# Patient Record
Sex: MALE | Race: BLACK OR AFRICAN AMERICAN | NOT HISPANIC OR LATINO | Employment: UNEMPLOYED | URBAN - METROPOLITAN AREA
[De-identification: names, ages, dates, MRNs, and addresses within clinical notes are randomized per-mention and may not be internally consistent; named-entity substitution may affect disease eponyms.]

---

## 2018-07-20 ENCOUNTER — HISTORICAL (OUTPATIENT)
Dept: RADIOLOGY | Facility: HOSPITAL | Age: 43
End: 2018-07-20

## 2021-02-24 ENCOUNTER — HISTORICAL (OUTPATIENT)
Dept: ADMINISTRATIVE | Facility: HOSPITAL | Age: 46
End: 2021-02-24

## 2021-03-05 ENCOUNTER — HISTORICAL (OUTPATIENT)
Dept: ADMINISTRATIVE | Facility: HOSPITAL | Age: 46
End: 2021-03-05

## 2021-03-05 LAB — SARS-COV-2 RNA RESP QL NAA+PROBE: NOT DETECTED

## 2021-03-09 ENCOUNTER — HISTORICAL (OUTPATIENT)
Dept: SURGERY | Facility: HOSPITAL | Age: 46
End: 2021-03-09

## 2021-03-24 ENCOUNTER — HISTORICAL (OUTPATIENT)
Dept: ADMINISTRATIVE | Facility: HOSPITAL | Age: 46
End: 2021-03-24

## 2021-04-28 ENCOUNTER — HISTORICAL (OUTPATIENT)
Dept: ADMINISTRATIVE | Facility: HOSPITAL | Age: 46
End: 2021-04-28

## 2021-12-22 ENCOUNTER — PATIENT OUTREACH (OUTPATIENT)
Dept: EMERGENCY MEDICINE | Facility: HOSPITAL | Age: 46
End: 2021-12-22

## 2022-01-07 ENCOUNTER — HISTORICAL (OUTPATIENT)
Dept: ADMINISTRATIVE | Facility: HOSPITAL | Age: 47
End: 2022-01-07

## 2022-01-07 LAB
ALBUMIN SERPL-MCNC: 4.7 GM/DL (ref 3.5–5)
ALBUMIN/GLOB SERPL: 1.8 RATIO (ref 1.1–2)
ALP SERPL-CCNC: 72 UNIT/L (ref 40–150)
ALT SERPL-CCNC: 16 UNIT/L (ref 0–55)
AST SERPL-CCNC: 14 UNIT/L (ref 5–34)
BILIRUB SERPL-MCNC: 0.4 MG/DL
BILIRUBIN DIRECT+TOT PNL SERPL-MCNC: 0.2 MG/DL (ref 0–0.5)
BILIRUBIN DIRECT+TOT PNL SERPL-MCNC: 0.2 MG/DL (ref 0–0.8)
BUN SERPL-MCNC: 9.4 MG/DL (ref 8.9–20.6)
CALCIUM SERPL-MCNC: 10 MG/DL (ref 8.7–10.5)
CHLORIDE SERPL-SCNC: 108 MMOL/L (ref 98–107)
CHOLEST SERPL-MCNC: 210 MG/DL
CHOLEST/HDLC SERPL: 5 {RATIO} (ref 0–5)
CO2 SERPL-SCNC: 25 MMOL/L (ref 22–29)
CREAT SERPL-MCNC: 1.34 MG/DL (ref 0.73–1.18)
CRP SERPL HS-MCNC: 0.05 MG/DL
DEPRECATED CALCIDIOL+CALCIFEROL SERPL-MC: 22.5 NG/ML (ref 30–80)
ERYTHROCYTE [SEDIMENTATION RATE] IN BLOOD: 5 MM/HR (ref 0–15)
EST CREAT CLEARANCE SER (OHS): 65.61 ML/MIN
EST. AVERAGE GLUCOSE BLD GHB EST-MCNC: 111.2 MG/DL
GLOBULIN SER-MCNC: 2.6 GM/DL (ref 2.4–3.5)
GLUCOSE SERPL-MCNC: 92 MG/DL (ref 74–100)
HBA1C MFR BLD: 5.5 %
HDLC SERPL-MCNC: 41 MG/DL (ref 35–60)
LDLC SERPL CALC-MCNC: 144 MG/DL (ref 50–140)
POTASSIUM SERPL-SCNC: 4.3 MMOL/L (ref 3.5–5.1)
PROT SERPL-MCNC: 7.3 GM/DL (ref 6.4–8.3)
PSA SERPL-MCNC: 0.4 NG/ML
SODIUM SERPL-SCNC: 140 MMOL/L (ref 136–145)
T4 FREE SERPL-MCNC: 0.87 NG/DL (ref 0.7–1.48)
TRIGL SERPL-MCNC: 123 MG/DL (ref 34–140)
TSH SERPL-ACNC: 0.84 UIU/ML (ref 0.35–4.94)
VIT B12 SERPL-MCNC: 494 PG/ML (ref 213–816)
VLDLC SERPL CALC-MCNC: 25 MG/DL

## 2022-03-18 ENCOUNTER — HISTORICAL (OUTPATIENT)
Dept: ADMINISTRATIVE | Facility: HOSPITAL | Age: 47
End: 2022-03-18

## 2022-04-10 ENCOUNTER — HISTORICAL (OUTPATIENT)
Dept: ADMINISTRATIVE | Facility: HOSPITAL | Age: 47
End: 2022-04-10
Payer: MEDICAID

## 2022-04-29 VITALS
BODY MASS INDEX: 27.16 KG/M2 | HEIGHT: 67 IN | OXYGEN SATURATION: 98 % | SYSTOLIC BLOOD PRESSURE: 122 MMHG | WEIGHT: 173.06 LBS | DIASTOLIC BLOOD PRESSURE: 74 MMHG

## 2022-04-30 NOTE — H&P
Patient:   Owen Montiel             MRN: [id number]            FIN: [id number]               Age:   45 years     Sex:  Male     :  1975   Associated Diagnoses:   None   Author:   Cisco Marquis MD      Patient presents today for surgery. There are no interval changes to the formal history and physical examination obtained at previous clinic appointment.     Patient will undergo surgery as described in previous appointment documentation.     Cisco Marquis MD
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
## 2022-05-02 NOTE — HISTORICAL [facility] CERNER
This is a historical note converted from [facility]. Formatting and pictures may have been removed.  Please reference [facility] for original formatting and attached multimedia. DATE OF SURGERY:?03/09/21  ?  PREOPERATIVE DIAGNOSES:  1.?right?ankle fracture  ?   POSTOPERATIVE DIAGNOSES:  ?   Same  ?   PROCEDURE: Open reduction and internal fixation of ankle fracture  ?  ATTENDING PHYSICIAN:?Gallo Mena MD  ?  RESIDENT PHYSICIANS: Dr Cisco Maruqis PGY3  ?  ANESTHESIA: Regional + General  ?  EBL: 20cc  ?  TOURNIQUET: 250 mmHg 35 min  ?  SPECIMEN: none  ?  IMPLANTS: Destiny Variax fibular plate, Arthrex tightrope  ?  COMPLICATIONS: none  ?  DISPOSITION: To recovery room in stable condition  ?  ?  INDICATIONS FOR PROCEDURE:?Owen Montiel?is a?45 Years?Male?who presented to our clinic for follow up assessment and definitive management of their ankle fracture.? While in clinic the risks, benefits, outcomes, and alternatives of conservative versus operative management were discussed with the patient. ?Informed consent was obtained for an open reduction and?internal fixation of the?ankle fracture.  ?  PROCEDURE IN DETAIL: ?The patient was brought into the operating room, positioned supine on the OR table. ?General anesthetic was administered by our anesthesia colleagues. ?The? ankle was prepped and draped in the usual fashion. ?A preoperative pause was performed to confirm the site and side for surgery. ?Preoperative antibiotics were given.?  ?  An incision was made over the distal fibular fracture site. ?This was carried down through subcutaneous tissue and down onto bone. ?Full-thickness flaps were elevated. ?Blunt dissection was made proximally to the fracture to protect the superficial peroneal nerve. ?The fracture was exposed and irrigated. ?It was cleaned with a combination of a?curette and small rongeurs.?  ?  FIBULAR FIXATION  ?  The fibular fracture was anatomically reduced with a combination of lobster claw and  pointed reduction forceps.???Utilizing a Woodruff variax fibular plate, we stabilized the fracture both proximally and distally with olives. ?Using intraoperative fluoroscopy, we confirmed that our fracture was anatomically reduced and that our hardware was in excellent position. ?We filled the plate distally with 3.5 mm locking screws and proximally with 3.5 mm cortical screws.  ?  SYNDESMOSIS FIXATION  ?  Once we were satisfied with our fixation, we screened the ankle under intraoperative fluoroscopy. ?External rotation stress demonstrated widening of the syndesmosis as well as a medial clear space.? We placed an Arthrex TightRope across the syndesmosis under direct fluoroscopic visualization. ?With manual compression, the TightRope was tightened down into its final position. ?We tied the lateral aspect of the TightRope and cut the remaining sutures.?  ?  We then screened the ankle under intraoperative fluoroscopy to confirm that our fracture was reduced anatomically and that our hardware was in excellent position and well aligned. ?Under external rotation stress, there was no syndesmosis or medial clear space widening.?  ?  A thorough irrigation of the hardware and the wound was performed.? The subcutaneous tissue was closed with 2-0 Vicryl suture. ?Skin was closed with 3-0 nylon sutures.??A sterile dressing was applied and the patient was placed in a short-leg splint. ?General anesthetic was reversed. ?There were no intraoperative complications. ?Estimated blood loss was 20 cc. ?The patient was transferred to a hospital stretcher and brought to the recovery room in stable condition.  ?  POSTOPERATIVE PLAN: ?The patient will remain nonweightbearing on the ankle for a total of 6-8 weeks from the time of surgery. ?We will them?in clinic in?2 weeks time to remove the splint, perform a wound check, remove sutures, and begin gentle range of motion exercises for the ankle.  ?

## 2022-05-14 NOTE — PROGRESS NOTES
Patient:   Owen Montiel             MRN: [id number]            FIN: [id number]               Age:   46 years     Sex:  Male     :  1975   Associated Diagnoses:   None   Author:   Eusebio Mchugh DO      Chief Complaint   right foot wound      History of Present Illness   Patient presents with right foot wound.  He has some maceration and pain between fourth and fifth digit in the toe web.  It has been present for years and he has flares every so often.  It has been worse over the last few weeks.  It is whitish in color and itches.  He has been applying over-the-counter fungal cream with little help.  He is otherwise healthy and only takes Cymbalta.    Today's information: Patient here for follow-up tinea pedis.  He has been applying ketoconazole to the wound and is also taking terbinafine daily.  He reports it is improved, the pain has gone away.      Review of Systems   Constitutional:  No fever.    Gastrointestinal:  No nausea.    Musculoskeletal:  Negative.    Integumentary:  Negative except as documented in history of present illness.       Health Status   Allergies:    Allergic Reactions (Selected)  No Known Allergies   Current medications:  (Selected)   Prescriptions  Prescribed  Cymbalta 20 mg oral delayed release capsule: 20 mg = 1 cap(s), Oral, BID, (do not crush or chew), # 60 cap(s), 6 Refill(s), Pharmacy: [id number], 170, cm, Height/Length Dosing, 22 10:09:00 CST, 78.2, kg, Weight Dosing, 22 10:09:00 CST  Documented Medications  Documented  cyclobenzaprine 10 mg oral tablet: 10 mg = 1 tab(s), Oral, TID  diclofenac sodium 75 mg oral delayed release tablet: 75 mg = 1 tab(s), Oral, BID  ibuprofen 800 mg oral tablet: 800 mg = 1 tab(s), Oral, TID  prednisONE 20 mg oral tablet: 20 mg = 1 tab(s), Oral, BID  tiZANidine 4 mg oral tablet: 8 mg = 2 tab(s), Oral, q8hr,    Home Medications (6) Active  cyclobenzaprine 10 mg oral tablet 10 mg = 1 tab(s), Oral,  TID  Cymbalta 20 mg oral delayed release capsule 20 mg = 1 cap(s), Oral, BID  diclofenac sodium 75 mg oral delayed release tablet 75 mg = 1 tab(s), Oral, BID  ibuprofen 800 mg oral tablet 800 mg = 1 tab(s), Oral, TID  prednisONE 20 mg oral tablet 20 mg = 1 tab(s), Oral, BID  tiZANidine 4 mg oral tablet 8 mg = 2 tab(s), Oral, q8hr        Histories   Past Medical History:    No active or resolved past medical history items have been selected or recorded.   Family History:    No family history items have been selected or recorded.   Procedure history:    ORIF Ankle (Right) on 3/9/2021 at 45 Years.  Comments:  3/11/2021 10:22 NICK - Lico KLINE, Ludmila MACHADO  auto-populated from documented surgical case  right finger amputation.  hernia repair.   Social History        Social & Psychosocial Habits    Alcohol  11/12/2014 Risk Assessment: High Risk    03/11/2022  Use: Past    Type: Beer, Liquor, Wine    Employment/School  01/07/2022  Status: Employed    Exercise    Comment: not at this time - 01/07/2022 09:27 - Tiffany Aleman    Home/Environment  01/07/2022  Lives with: Siblings    Nutrition/Health  01/07/2022  Home Diet Regular    Appetite Good    Sexual  01/07/2022  Sexually active: Yes    What is your current gender identity? (Check all that apply) Identifies as male    Substance Use  11/12/2014 Risk Assessment: High Risk    03/09/2021  Use: Past    Type: Cocaine, Marijuana    01/07/2022  Use: Current    Type: Marijuana    03/11/2022  Type: Marijuana    Frequency: Daily    Tobacco  11/12/2014 Risk Assessment: High Risk    04/01/2022  Use: 5-9 cigarettes (between 1    Patient Wants Consult For Cessation Counseling No    Abuse/Neglect  04/01/2022  SHX Any signs of abuse or neglect No    Spiritual/Cultural  01/07/2022  Ep[employer]op[employer] Preference [employer]    Spiritual Services to Visit? Yes    Financial/Legal Situation  01/07/2022  Financial Issues Low income      01/07/2022  Branch of  Never in   .        Physical  Examination   Vital Signs   4/1/2022 9:46 CDT        Temperature Oral          37.0 DegC                             Temperature Oral (calculated)             98.60 DegF                             Peripheral Pulse Rate     80 bpm                             Respiratory Rate          18 br/min                             Systolic Blood Pressure   121 mmHg                             Diastolic Blood Pressure  75 mmHg     Neck:  Supple.    Respiratory:  Respirations are non-labored.    Cardiovascular:  Normal rate.    Psychiatric:  Cooperative.    Incision/Wounds   1. Toe Right Other: 5th toe fungal infection - last charted: 04/01/2022 09:00       Assessment Done By - Wound Care Team       Abnormality Pattern - Flat       Wound Bed Tissue Type - Moist           Review / Management   Results review      Impression and Plan   Diagnosis     Tinea pedisimproved, continue course of terbinafine, gave instructions for gentian violet to apply daily for 2 weeks  Follow-up in clinic as needed

## 2022-05-14 NOTE — PROGRESS NOTES
Patient:   Owen Montiel             MRN: [id number]            FIN: [id number]               Age:   46 years     Sex:  Male     :  1975   Associated Diagnoses:   None   Author:   Eusebio Mchugh DO      Chief Complaint   right foot wound      History of Present Illness   Patient presents with right foot wound.  He has some maceration and pain between fourth and fifth digit in the toe web.  It has been present for years and he has flares every so often.  It has been worse over the last few weeks.  It is whitish in color and itches.  He has been applying over-the-counter fungal cream with little help.  He is otherwise healthy and only takes Cymbalta.      Review of Systems   Constitutional:  No fever.    Gastrointestinal:  No nausea.    Musculoskeletal:  Negative.    Integumentary:  Negative except as documented in history of present illness.       Health Status   Allergies:    Allergic Reactions (Selected)  No Known Allergies   Current medications:  (Selected)   Prescriptions  Prescribed  Cymbalta 20 mg oral delayed release capsule: 20 mg = 1 cap(s), Oral, BID, (do not crush or chew), # 60 cap(s), 6 Refill(s), Pharmacy: [id number], 170, cm, Height/Length Dosing, 22 10:09:00 CST, 78.2, kg, Weight Dosing, 22 10:09:00 CST  Documented Medications  Documented  cyclobenzaprine 10 mg oral tablet: 10 mg = 1 tab(s), Oral, TID,    Home Medications (2) Active  cyclobenzaprine 10 mg oral tablet 10 mg = 1 tab(s), Oral, TID  Cymbalta 20 mg oral delayed release capsule 20 mg = 1 cap(s), Oral, BID        Histories   Past Medical History:    No active or resolved past medical history items have been selected or recorded.   Family History:    No family history items have been selected or recorded.   Procedure history:    ORIF Ankle (Right) on 3/9/2021 at 45 Years.  Comments:  3/11/2021 10:22 CST - Lico KLINE, Ludmila MACHADO  auto-populated from documented surgical case  right finger  amputation.  hernia repair.   Social History        Social & Psychosocial Habits    Alcohol  11/12/2014 Risk Assessment: High Risk    03/11/2022  Use: Past    Type: Beer, Liquor, Wine    Employment/School  01/07/2022  Status: Employed    Exercise    Comment: not at this time - 01/07/2022 09:27 - Tiffany Aleman    Home/Environment  01/07/2022  Lives with: Siblings    Nutrition/Health  01/07/2022  Home Diet Regular    Appetite Good    Sexual  01/07/2022  Sexually active: Yes    What is your current gender identity? (Check all that apply) Identifies as male    Substance Use  11/12/2014 Risk Assessment: High Risk    03/09/2021  Use: Past    Type: Cocaine, Marijuana    01/07/2022  Use: Current    Type: Marijuana    03/11/2022  Type: Marijuana    Frequency: Daily    Tobacco  11/12/2014 Risk Assessment: High Risk    03/18/2022  Use: 4 or less cigarettes(less    Type: Cigarettes    Patient Wants Consult For Cessation Counseling N/A    Abuse/Neglect  03/18/2022  SHX Any signs of abuse or neglect No    Feels unsafe at home: No    Safe place to go: Yes    Spiritual/Cultural  01/07/2022  Jew Preference Zoroastrian    Spiritual Services to Visit? Yes    Financial/Legal Situation  01/07/2022  Financial Issues Low income      01/07/2022  Branch of  Never in   .        Physical Examination   Vital Signs   3/18/2022 10:00 CDT      Temperature Oral          36.9 DegC                             Temperature Oral (calculated)             98.42 DegF                             Peripheral Pulse Rate     74 bpm                             Respiratory Rate          18 br/min                             Systolic Blood Pressure   115 mmHg                             Diastolic Blood Pressure  80 mmHg                             Mean Arterial Pressure, Cuff              92 mmHg     General:  No acute distress.    Eye:  Normal conjunctiva.    HENT:  Normal hearing.    Neck:  Supple.    Psychiatric:  Cooperative.     Incision/Wounds   1. Toe Right Other: 5th toe fungal infection - last charted: 03/18/2022 10:00       Assessment Done By - Wound Care Team       Abnormality Color - White       Rash Distribution - Localized       Surrounding Tissue Color - White       Surrounding Tissue Condition - Moist           Review / Management   Results review      Impression and Plan   Diagnosis     Tinea pedisterbinafine daily x2 weeks, ketoconazole cream twice daily  Follow-up in clinic in 2 weeks

## 2022-05-16 ENCOUNTER — PATIENT OUTREACH (OUTPATIENT)
Dept: EMERGENCY MEDICINE | Facility: HOSPITAL | Age: 47
End: 2022-05-16
Payer: MEDICAID

## 2022-05-17 ENCOUNTER — PATIENT OUTREACH (OUTPATIENT)
Dept: EMERGENCY MEDICINE | Facility: HOSPITAL | Age: 47
End: 2022-05-17
Payer: MEDICAID

## 2022-05-30 ENCOUNTER — PATIENT OUTREACH (OUTPATIENT)
Dept: EMERGENCY MEDICINE | Facility: HOSPITAL | Age: 47
End: 2022-05-30
Payer: MEDICAID

## 2022-06-15 ENCOUNTER — PATIENT OUTREACH (OUTPATIENT)
Dept: EMERGENCY MEDICINE | Facility: HOSPITAL | Age: 47
End: 2022-06-15
Payer: MEDICAID

## 2022-06-15 NOTE — PROGRESS NOTES
Spoke to pt for f/u call, doing well, no compliant. Discussed medication and budget friendly healthy eating compliance and benefits of pcp and all providers and ancillary care and ED utilization. Stressed importance of f/u care with all provides and attending all scheduled appts and advised of noted scheduled appt with pcp on 6/20/22 at 2:15pm. Pt voices understanding to instructions given and appreciation.     Appointments   Follow-Up Appt Scheduled :   Yes   Follow-Up Provider Appt Scheduled By :  Clinic staff  PCP Name :   [facility]   Follow-Up Date :  6/20/22 at 2:15pm telemed  Follow-Up Appointment Status :   Confirms scheduled appointment    PCP Visit Within Year :   4/11/22  Follow-Up Specialist Appt Scheduled :   No     Provider seen within the last year:    Leola Odom MD (PCP)

## 2022-07-12 ENCOUNTER — OFFICE VISIT (OUTPATIENT)
Dept: FAMILY MEDICINE | Facility: CLINIC | Age: 47
End: 2022-07-12
Payer: MEDICAID

## 2022-07-12 DIAGNOSIS — H57.13 PAIN OF BOTH EYES: ICD-10-CM

## 2022-07-12 DIAGNOSIS — H57.13 EYE PAIN, BILATERAL: Primary | ICD-10-CM

## 2022-07-12 DIAGNOSIS — F17.200 SMOKER: ICD-10-CM

## 2022-07-12 PROCEDURE — 99213 OFFICE O/P EST LOW 20 MIN: CPT | Mod: 95,,, | Performed by: STUDENT IN AN ORGANIZED HEALTH CARE EDUCATION/TRAINING PROGRAM

## 2022-07-12 PROCEDURE — 1159F PR MEDICATION LIST DOCUMENTED IN MEDICAL RECORD: ICD-10-PCS | Mod: CPTII,95,, | Performed by: STUDENT IN AN ORGANIZED HEALTH CARE EDUCATION/TRAINING PROGRAM

## 2022-07-12 PROCEDURE — 99213 PR OFFICE/OUTPT VISIT, EST, LEVL III, 20-29 MIN: ICD-10-PCS | Mod: 95,,, | Performed by: STUDENT IN AN ORGANIZED HEALTH CARE EDUCATION/TRAINING PROGRAM

## 2022-07-12 PROCEDURE — 1159F MED LIST DOCD IN RCRD: CPT | Mod: CPTII,95,, | Performed by: STUDENT IN AN ORGANIZED HEALTH CARE EDUCATION/TRAINING PROGRAM
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
## 2022-08-11 ENCOUNTER — OFFICE VISIT (OUTPATIENT)
Dept: OPHTHALMOLOGY | Facility: CLINIC | Age: 47
End: 2022-08-11
Payer: MEDICAID

## 2022-08-11 VITALS — WEIGHT: 173.06 LBS | BODY MASS INDEX: 27.16 KG/M2 | HEIGHT: 67 IN

## 2022-08-11 DIAGNOSIS — H57.13 EYE PAIN, BILATERAL: ICD-10-CM

## 2022-08-11 PROCEDURE — 92133 CPTRZD OPH DX IMG PST SGM ON: CPT | Mod: PBBFAC,PO

## 2022-08-11 PROCEDURE — 99213 OFFICE O/P EST LOW 20 MIN: CPT | Mod: PBBFAC,PO

## 2022-08-11 RX ORDER — DULOXETIN HYDROCHLORIDE 20 MG/1
20 CAPSULE, DELAYED RELEASE ORAL
COMMUNITY
Start: 2022-03-11

## 2022-08-11 RX ORDER — PREDNISONE 20 MG/1
20 TABLET ORAL
COMMUNITY
Start: 2022-03-29 | End: 2023-01-30

## 2022-08-11 RX ORDER — IBUPROFEN 800 MG/1
800 TABLET ORAL
COMMUNITY
Start: 2022-03-29 | End: 2023-06-08

## 2022-08-11 RX ORDER — GABAPENTIN 300 MG/1
CAPSULE ORAL
COMMUNITY
Start: 2022-04-26 | End: 2023-01-30

## 2022-08-11 RX ORDER — TERBINAFINE HYDROCHLORIDE 250 MG/1
TABLET ORAL
COMMUNITY
Start: 2022-03-18 | End: 2023-01-30

## 2022-08-11 RX ORDER — TIZANIDINE 4 MG/1
8 TABLET ORAL
COMMUNITY
Start: 2022-03-29 | End: 2023-01-30

## 2022-08-11 NOTE — PROGRESS NOTES
Testing  OCT RNFL 8/11/22  - OD: 80um, yellow S, rest green  - OS: 79um, yellow S/T, rest green    Assessment/Plan    1. Refractive error with astigmatism  - Having glare when driving at night, occasional headaches from eye strain. Headaches are retro-orbital and generalize to the back of his head. Denies visual changes or N/V during the headaches.   - MRX today to 20/20  - Headaches may be secondary to eye strain, glare may be from astigmatism. Will follow-up to see if headaches resolve.  - Pt deferred DFE today    2. Dry eye syndrome  - Experiences occasional eye pain when staring at his phone  - LINO and ATs QID    Return to clinic in 3 months for DFE

## 2022-09-22 ENCOUNTER — PATIENT OUTREACH (OUTPATIENT)
Dept: EMERGENCY MEDICINE | Facility: HOSPITAL | Age: 47
End: 2022-09-22
Payer: MEDICAID

## 2022-09-26 NOTE — PROGRESS NOTES
Original encounter date 12/22/2021 in [facility] EMR.  Information placed in Epic for continuity purposes.  HealthE Care Entered On:  12/22/2021 15:37 CST    Performed On:  12/22/2021 15:29 CST by Petra King LPN               Discharge Past 30 Days   Visit to the Hospital in the Last 30 Days :   Emergency department (ED) visit   Reason for Choosing ED for Care :   Could not get primary care appointment   Perception of Change in Health Status DC :   Improving   Discharged To :   Home independently   DC Instructions :   Received discharge instructions , Understands discharge instructions    Education Provided :   ED utilization, Importance of keeping appointments, Community resources, Medication Compliance, Diet Compliance   (Comment: budget friendly healthy eating [Petra King LPN - 12/22/2021 15:18 CST] )   Discharge Past 30 Days Addntl Comments :   spoke to pt for f/u ED visit, pt reports he is feeling better. advised pt to call and obtain pcp appt to est care, pcp options given to pt, pt request assistance with obtaining pcp appt with [facility] and request appt on a Friday his off day from work. pt denies any health problems/conditions. discussed medication and budget healthy eating compliance. stressed importance of est pcp and follow up care and to utilize [facility] for non-emergency until est pcp and when pcp is not available, [facility] options given to pt and to visit ED for emergency issues. pt denies sob and chest pain or any concerns. pt wish for continue f/u calls and voices understanding to instructions given and appreciation. pt request to mail sdoh education/resource and appt letter mail to him once obtain pcp appt, verified pt address with pt. advised pt will notify him once obtain pcp appt, pt also request to text appt information to him as well.  called [facility] and obtain new pt appt for Friday 1/7/22 at 9:20am.  called pt to notify of noted scheduled appt, vm, lm for pt to return call,  text appt information to pt per pt request.  pt returned call and was notified pt of scheduled new pt appt with [facility] entrance # 8 Friday 1/7/22 at 9:20am, pt reports he did receive text on appt information, stressed importance to pt on attending appt to est care and f/u care, pt voices understanding and appreciation.  mailed sdoh education/resource and appt letter to pt per pt request.     Petra King LPN - 12/22/2021 15:18 CST   Barriers to Care   SDoH Eat Less Than You Should :   No   SDoH Shut Off Services to Your Home :   No   SDoH No Regular Place to Live :   No   SDoH Needed Provider but Costs too Much :   No   SDoH Help Reading and Writing Paper Work :   No   SDoH Feel Lonely Often :   No   SDoH Missed Appt/Meds- No Transportation :   No   SDoH Call Dr To Be Seen Right Away :   No   SDoH Medical Problems Cause ED Visit :   No   SDoH Other Problems Affecting Health :   No   SDoH Reviewed :   Yes   SDoH Dentist Seen in Last Year :   No   (Comment: pt request to mail dental provider options to him [Petra King LPN - 12/22/2021 15:18 CST] )   Petra King LPN - 12/22/2021 15:18 CST   Prescriptions   Medication Reconcilation Completed :   Unknown   Medication Prescriptions Filled After DC :   Confirms all medications filled , Confirms taking medications as prescribed    Questions About Meds Prescribed at DC :   Denies any questions or concerns                    Petra King LPN - 12/22/2021 15:18 CST   Appointments   Follow-Up Appt Scheduled :   Yes   Follow-Up Provider Appt Scheduled By :   [facility] navigator   PCP Name :   [facility]   Follow-Up Date :   1/7/2022 CST   Follow-Up Appointment Status :   Confirms scheduled appointment    PCP Visit Within Year :   No   Follow-Up Specialist Appt Scheduled :   No   Petra King LPN - 12/22/2021 15:18 CST   Navigation   Initial Assesment Completed By :   Phone   ED FIN :   [id number]   [facility] Navigation Call Log  :   Initial MCIP contact   Referral To HE Care :   MCIP Navigation   Transportation Arrangements Made :   Yes   Root Causes for High-ED Utilization :   Lack of access to care   Plan: :   Educated patient on process of establishing PCP, Educated on appropriate ED utilization, Educated on alternate means of health care; ie urgent care when PCP not available, Educated on importance of follow up care with PCP, Referred to community resources; ie [place], Set up appointment, Educated on importance of follow up preventative dental care with dentist, Budget-friendly health eating education given, Other: [facility] EDUCATION/RESOURCE AND APPT LETTER TO PT PER PT REQUEST   Petra King LPN - 12/22/2021 15:18 CST   Participation in Activity Designed to Address Lack of Annual Ambulatory or Preventative Care Visit? :   Yes   Petra King LPN - 1/11/2022 13:06 CST   Participation in Activity Designed to Address Avoidable ED Utilization? :   Yes   Petra King LPN - 12/22/2021 15:18 CST   During Current Measurement Year, Did Enrollee Receive Education Regarding Outpatient Primary Care Options? :   Yes   During Current Measurement Year, Did Enrollee Receive an Appt Reminder 24-48 Hours Before a Scheduled Appt? :   Yes   During Current Measurement Year, Did the Network Provider Schedule and Appt or Provide a Referral to Enrollee? :   Yes   Petra King LPN - 1/11/2022 13:06 CST   Education Provided :   Verbal, Written   Petra King LPN - 12/22/2021 15:18 CST     Result type: HealthE Care - Text  Result date: December 22, 2021 15:29 CST  Result status: Modified  Result title: HealthE Care  Performed by: Petra King LPN on December 22, 2021 15:18 CST  Verified by: Petra King LPN on December 22, 2021 15:18 CST  Encounter info: [id number], [facility] COMMUNITY CARE MANAGEMENT, Utilization Coordination, 12/21/2021 -

## 2022-09-26 NOTE — PATIENT INSTRUCTIONS
Why is taking care of your mouth/teeth/gums important?     Your mouth is the opening to your body.  If not kept clean, it can let in sickness to the rest of your body.     Oral Health Care (Dentists)  [facility], [facility].  [address] [facility], [place] [address], [phone]  [facility],    [address] [place] [address] [phone]  [facility]  [address] [place] [address], [phone]  [facility]  [address] [facility] [address], [phone]  [facility]  [address] [facility] [address] [phone]  [facility]  [address] [place] [address] [phone]  [facility], [address] [id number] [phone]  [facility], [address] [facility] [address] [phone]  [facility]           [address] [phone]  [facility], [address] [place] [address], [phone]  Accepts [facility],  and [facility];[address] [place] [address]; [phone]             ACCEPTS [facility],  AND [facility]; [place]: [phone]  [facility]; [place]: [phone]     [facility]      [facility]  [address] [address]  Phone: [phone]  (Accepts all Medicaid for visit and glasses)     [facility]  [address] [place] [address]  Phone: [phone]  (Only [facility] Medicaid for visit)  (All plans for glasses)     [facility]   [address] [place] [address]  Phone: [phone]  (All Medicaid plans for visit and glasses)  (Only 1 doctor that comes twice a month)        [facility]  [address]  [address]  Phone: [phone]  (No Medicaid for visit)  ([employer], [facility], [facility] Healthy Blue for glasses)    [facility]  [address] [address] [facility] [address] [facility] [address] [address]  phone: [phone]  (Accepts all Medicaid)                 [facility]  [address] [facility] [place] [address]. [address]  phone: [phone]  (Accepts [facility], [facility] and [facility] Medicaid)     [facility]  [address] [facility] [place]; [address]  phone [phone]  (Accepts all Medicaid)

## 2022-09-26 NOTE — PROGRESS NOTES
Spoke to pt for f/u call, doing well. Applaud pt on attending appts, stressed importance of benefits of pcp and all providers and ancillary care. Discussed medication and budget friendly healthy eating compliance. Advised pt to utilize ucc for non emergency issues when pcp is unavailable. Encouraged pt to download [facility] for pt portal. Pt request to mail appt letter with information on instructions to download [facility] for pt portal. Verified pt address with pt. Pt voices understanding to instructions given and appreciation.

## 2022-09-26 NOTE — PROGRESS NOTES
Spoke to pt for f/u, doing well, no complaints. Discussed medication and budget friendly healthy eating, ED utilization, upcoming scheduled appts, advised pt to call and obtain f/u appt with pcp, vision and oral care, gave pt contact # to pcp office and vision clinic resource with contact #s, stressed importance of f/u care with all providers and ancillary care. Advised pt to call for non urgent issues. Pt voices understanding to instructions given and appreciation.   Resolved/closed encounter, no ED visits in greater than 6 months.

## 2022-09-26 NOTE — PATIENT INSTRUCTIONS
* Final Report *    Education Note (Verified)  Patient Education Materials Follows:  Why Should I Have My Own Doctor or Nurse Practitioner (PCP) to Take Care of Me  What is a PCP (Primary Care Provider)?                    A primary care provider is a doctor or nurse practitioner who you can call for an appointment and will see you when you are sick.    You will also be seen at scheduled appointment times during the year to check on your diabetes, or high blood pressure, or heart disease.    Why see the same PCP (doctor/nurse practitioner)?  You can be seen faster when you are sick                                                                                                                                                                                                                                                                                                          You, the PCP (doctor/nurse practitioner) and the office staff get to know each other; you begin to trust them to care for you. You take part in your health choices.   All of you together are a team.    Your medicine is looked at every time you visit, to be sure you are taking the medicine, as the PCP (doctor/nurse practitioner) ordered.  Your PCP (doctor/nurse practitioner) and their staff help keep you healthy and out of the hospital.  They can catch sicknesses earlier by ordering tests once a year to stop or prevent the sickness from getting worse.                                                     Your PCP (doctor/nurse practitioner) can send you to providers who specialize (heart/bone/lung) if you need.  They and their office staff help keep track of your seeing other providers (doctors/nurse practitioners) and tests (CT/ MRIs/ X Rays)) taken.                                          PCPs want you to stay healthy.  Let us care for you.                                                         Why is taking care of your mouth/teeth/gums  important?                                                                             Your mouth is the opening to your body.  If not kept clean, it can let in sickness to the rest of your body.                                                 [facility] (Dentists)  [facility], [facility].            [address] [place] [address], [phone]  [facility],             [address] [place] [address] [phone]  [facility]            [address] [place] [address], [phone]  [facility]            [address] [place] [address], [phone]  [facility]            [address] [place] [address] [phone]  [facility]            [address] [place] [address] [phone]  [facility], [address] [address] [phone]  [facility], [address] [facility] [address] [phone]  [facility]             [address] [place] [address] [phone]  [facility]; [place]: [phone]   [facility]; [place]: [phone]    [facility], [address] [facility] [address], [phone]  (Accepts [facility], HB and [employer])  [facility];[address] [place] [address]; [phone]  (ACCEPTS [facility], HB AND [employer])             Nutrition  Budget-Friendly Healthy Eating  There are many ways to save money at the grocery store and continue to eat healthy. You can be successful if you:   Plan meals according to your budget.   Make a grocery list and only purchase food according to your grocery list.   Prepare food yourself.  What are tips for following this plan?    Reading food labels   Compare food labels between brand name foods and the  "store brand. Often the nutritional value is the same, but the store brand is lower cost.   Look for products that do not have added sugar, fat, or salt (sodium). These often cost the same but are healthier for you. Products may be labeled as:  ? Sugar-free.  ? Nonfat.  ? Low-fat.  ? Sodium-free.  ? Low-sodium.   Look for lean ground beef labeled as at least 92% lean and 8% fat.  Shopping   Buy only the items on your grocery list and go only to the areas of the store that have the items on your list.   Use coupons only for foods and brands you normally buy. Avoid buying items you wouldn't normally buy simply because they are on sale.   Check online and in newspapers for weekly deals.   Buy healthy items from the bulk bins when available, such as herbs, spices, flour, pasta, nuts, and dried fruit.   Buy fruits and vegetables that are in season. Prices are usually lower on in-season produce.   Look at the unit price on the price tag. Use it to compare different brands and sizes to find out which item is the best deal.   Choose healthy items that are often low-cost, such as carrots, potatoes, apples, bananas, and oranges. Dried or canned beans are a low-cost protein source.   Buy in bulk and freeze extra food. Items you can buy in bulk include meats, fish, poultry, frozen fruits, and frozen vegetables.   Avoid buying "ready-to-eat" foods, such as pre-cut fruits and vegetables and pre-made salads.   If possible, shop around to discover where you can find the best prices. Consider other retailers such as dollar stores, larger wholesale stores, local fruit and vegetable [employer], and [employer] markets.   Do not shop when you are hungry. If you shop while hungry, it may be hard to stick to your list and budget.   Resist impulse buying. Use your grocery list as your official plan for the week.   Buy a variety of vegetables and fruits by purchasing fresh, frozen, and canned items.   Look at the top and bottom shelves for deals. " "Foods at eye level (eye level of an adult or child) are usually more expensive.   Be efficient with your time when shopping. The more time you spend at the store, the more money you are likely to spend.   To save money when choosing more expensive foods like meats and dairy:  ? Choose cheaper cuts of meat, such as bone-in chicken thighs and drumsticks instead of skinless and boneless chicken. When you are ready to prepare the chicken, you can remove the skin yourself to make it healthier.  ? Choose lean meats like chicken or turkey instead of beef.  ? Choose canned seafood, such as tuna, salmon, or sardines.  ? Buy eggs as a low-cost source of protein.  ? Buy dried beans and peas, such as lentils, split peas, or kidney beans instead of meats. Dried beans and peas are a good alternative source of protein.  ? Buy the larger tubs of yogurt instead of individual-sized containers.   Choose water instead of sodas and other sweetened beverages.   Avoid buying chips, cookies, and other "junk food." These items are usually expensive and not healthy.  Cooking   Make extra food and freeze the extras in meal-sized containers or in individual portions for fast meals and snacks.   Pre-cook on days when you have extra time to prepare meals in advance. You can keep these meals in the fridge or freezer and reheat for a quick meal.   When you come home from the grocery store, wash, peel, and cut fruits and vegetables so they are ready to use and eat. This will help reduce food waste.  Meal planning   Do not eat out or get fast food. Prepare food at home.   Make a grocery list and make sure to bring it with you to the store. If you have a smart phone, you could use your phone to create your shopping list.   Plan meals and snacks according to a grocery list and budget you create.   Use leftovers in your meal plan for the week.   Look for recipes where you can cook once and make enough food for two meals.   Include budget-friendly meals " "like stews, casseroles, and stir-wagner dishes.   Try some meatless meals or try "no cook" meals like salads.   Make sure that half your plate is filled with fruits or vegetables. Choose from fresh, frozen, or canned fruits and vegetables. If eating canned, remember to rinse them before eating. This will remove any excess salt added for packaging.  Summary   Eating healthy on a budget is possible if you plan your meals according to your budget, purchase according to your budget and grocery list, and prepare food yourself.   Tips for buying more food on a limited budget include buying generic brands, using coupons only for foods you normally buy, and buying healthy items from the bulk bins when available.   Tips for buying cheaper food to replace expensive food include choosing cheaper, lean cuts of meat, and buying dried beans and peas.  This information is not intended to replace advice given to you by your health care provider. Make sure you discuss any questions you have with your health care provider.  Document Released: 08/21/2015 Document Revised: 12/19/2018 Document Reviewed: 12/19/2018  [employer] Interactive Patient Education © 2019 [employer]    Pulmonary Medicine  Steps to Quit Smoking    Smoking tobacco can be harmful to your health and can affect almost every organ in your body. Smoking puts you, and those around you, at risk for developing many serious chronic diseases. Quitting smoking is difficult, but it is one of the best things that you can do for your health. It is never too late to quit.  What are the benefits of quitting smoking?  When you quit smoking, you lower your risk of developing serious diseases and conditions, such as:   Lung cancer or lung disease, such as COPD.   Heart disease.   Stroke.   Heart attack.   Infertility.   Osteoporosis and bone fractures.  Additionally, symptoms such as coughing, wheezing, and shortness of breath may get better when you quit. You may also find that you " get sick less often because your body is stronger at fighting off colds and infections. If you are pregnant, quitting smoking can help to reduce your chances of having a baby of low birth weight.  How do I get ready to quit?  When you decide to quit smoking, create a plan to make sure that you are successful. Before you quit:   Pick a date to quit. Set a date within the next two weeks to give you time to prepare.   Write down the reasons why you are quitting. Keep this list in places where you will see it often, such as on your bathroom mirror or in your car or wallet.   Identify the people, places, things, and activities that make you want to smoke (triggers) and avoid them. Make sure to take these actions:  ? Throw away all cigarettes at home, at work, and in your car.  ? Throw away smoking accessories, such as ashtrays and lighters.  ? Clean your car and make sure to empty the ashtray.  ? Clean your home, including curtains and carpets.   Tell your family, friends, and coworkers that you are quitting. Support from your loved ones can make quitting easier.   Talk with your health care provider about your options for quitting smoking.   Find out what treatment options are covered by your health insurance.  What strategies can I use to quit smoking?    Talk with your healthcare provider about different strategies to quit smoking. Some strategies include:   Quitting smoking altogether instead of gradually lessening how much you smoke over a period of time. Research shows that quitting cold turkey is more successful than gradually quitting.   Attending in-person counseling to help you build problem-solving skills. You are more likely to have success in quitting if you attend several counseling sessions. Even short sessions of 10 minutes can be effective.   Finding resources and support systems that can help you to quit smoking and remain smoke-free after you quit. These resources are most helpful when you use them  often. They can include:  ? Online chats with a counselor.  ? Telephone quitlines.  ? Printed self-help materials.  ? Support groups or group counseling.  ? Text messaging programs.  ? Mobile phone applications.   Taking medicines to help you quit smoking. (If you are pregnant or breastfeeding, talk with your health care provider first.) Some medicines contain nicotine and some do not. Both types of medicines help with cravings, but the medicines that include nicotine help to relieve withdrawal symptoms. Your health care provider may recommend:  ? Nicotine patches, gum, or lozenges.  ? Nicotine inhalers or sprays.  ? Non-nicotine medicine that is taken by mouth.  Talk with your health care provider about combining strategies, such as taking medicines while you are also receiving in-person counseling. Using these two strategies together makes you more likely to succeed in quitting than if you used either strategy on its own.  If you are pregnant or breastfeeding, talk with your health care provider about finding counseling or other support strategies to quit smoking. Do not take medicine to help you quit smoking unless told to do so by your health care provider.  What things can I do to make it easier to quit?    Quitting smoking might feel overwhelming at first, but there is a lot that you can do to make it easier. Take these important actions:   Reach out to your family and friends and ask that they support and encourage you during this time. Call telephone quitlines, reach out to support groups, or work with a counselor for support.   Ask people who smoke to avoid smoking around you.   Avoid places that trigger you to smoke, such as bars, parties, or smoke-break areas at work.   Spend time around people who do not smoke.   Lessen stress in your life, because stress can be a smoking trigger for some people. To lessen stress, try:  ? Exercising regularly.  ? Deep-breathing  exercises.  ? Yoga.  ? Meditating.  ? Performing a body scan. This involves closing your eyes, scanning your body from head to toe, and noticing which parts of your body are particularly tense. Purposefully relax the muscles in those areas.   Download or purchase mobile phone or tablet apps (applications) that can help you stick to your quit plan by providing reminders, tips, and encouragement. There are many free apps, such as QuitGuide from the CDC (Centers for Disease Control and Prevention). You can find other support for quitting smoking (smoking cessation) through smokefree.gov and other websites.  How will I feel when I quit smoking?  Within the first 24 hours of quitting smoking, you may start to feel some withdrawal symptoms. These symptoms are usually most noticeable 2-3 days after quitting, but they usually do not last beyond 2-3 weeks. Changes or symptoms that you might experience include:   Mood swings.   Restlessness, anxiety, or irritation.   Difficulty concentrating.   Dizziness.   Strong cravings for sugary foods in addition to nicotine.   Mild weight gain.   Constipation.   Nausea.   Coughing or a sore throat.   Changes in how your medicines work in your body.   A depressed mood.   Difficulty sleeping (insomnia).  After the first 2-3 weeks of quitting, you may start to notice more positive results, such as:   Improved sense of smell and taste.   Decreased coughing and sore throat.   Slower heart rate.   Lower blood pressure.   Clearer skin.   The ability to breathe more easily.   Fewer sick days.  Quitting smoking is very challenging for most people. Do not get discouraged if you are not successful the first time. Some people need to make many attempts to quit before they achieve long-term success. Do your best to stick to your quit plan, and talk with your health care provider if you have any questions or concerns.  This information is not intended to replace advice given to you by your health  care provider. Make sure you discuss any questions you have with your health care provider.  Document Released: 12/12/2002 Document Revised: 07/24/2018 Document Reviewed: 05/03/2016  [employer] Interactive Patient Education © 2019 [employer]        Result type: Education Note  Result date: December 22, 2021 14:55 CST  Result status: Auth (Verified)  Result title: Education Note  Performed by: Petra King LPN on December 22, 2021 14:55 CST  Verified by: Petra King LPN on December 22, 2021 14:55 CST  Encounter info: [id number], [facility] CARE MANAGEMENT, Utilization Coordination, 12/21/2021 -

## 2022-10-24 ENCOUNTER — OFFICE VISIT (OUTPATIENT)
Dept: FAMILY MEDICINE | Facility: CLINIC | Age: 47
End: 2022-10-24
Payer: MEDICAID

## 2022-10-24 VITALS
OXYGEN SATURATION: 100 % | RESPIRATION RATE: 20 BRPM | WEIGHT: 171 LBS | BODY MASS INDEX: 27.48 KG/M2 | HEART RATE: 77 BPM | HEIGHT: 66 IN | DIASTOLIC BLOOD PRESSURE: 78 MMHG | TEMPERATURE: 99 F | SYSTOLIC BLOOD PRESSURE: 123 MMHG

## 2022-10-24 DIAGNOSIS — R76.8 RHEUMATOID FACTOR POSITIVE: ICD-10-CM

## 2022-10-24 DIAGNOSIS — Z00.00 WELLNESS EXAMINATION: Primary | ICD-10-CM

## 2022-10-24 DIAGNOSIS — M05.80 POLYARTHRITIS WITH POSITIVE RHEUMATOID FACTOR: ICD-10-CM

## 2022-10-24 DIAGNOSIS — Z12.11 SCREENING FOR COLON CANCER: ICD-10-CM

## 2022-10-24 PROBLEM — H57.13 PAIN OF BOTH EYES: Status: RESOLVED | Noted: 2022-07-12 | Resolved: 2022-10-24

## 2022-10-24 LAB
ALBUMIN SERPL-MCNC: 4.5 GM/DL (ref 3.5–5)
ALBUMIN/GLOB SERPL: 1.7 RATIO (ref 1.1–2)
ALP SERPL-CCNC: 65 UNIT/L (ref 40–150)
ALT SERPL-CCNC: 15 UNIT/L (ref 0–55)
APPEARANCE UR: CLEAR
AST SERPL-CCNC: 16 UNIT/L (ref 5–34)
BACTERIA #/AREA URNS AUTO: ABNORMAL /HPF
BASOPHILS # BLD AUTO: 0.06 X10(3)/MCL (ref 0–0.2)
BASOPHILS NFR BLD AUTO: 0.8 %
BILIRUB UR QL STRIP.AUTO: NEGATIVE MG/DL
BILIRUBIN DIRECT+TOT PNL SERPL-MCNC: 0.3 MG/DL
BUN SERPL-MCNC: 12.5 MG/DL (ref 8.9–20.6)
CALCIUM SERPL-MCNC: 10 MG/DL (ref 8.4–10.2)
CHLORIDE SERPL-SCNC: 106 MMOL/L (ref 98–107)
CHOLEST SERPL-MCNC: 204 MG/DL
CHOLEST/HDLC SERPL: 5 {RATIO} (ref 0–5)
CO2 SERPL-SCNC: 24 MMOL/L (ref 22–29)
COLOR UR AUTO: YELLOW
CREAT SERPL-MCNC: 1.09 MG/DL (ref 0.73–1.18)
EOSINOPHIL # BLD AUTO: 0.11 X10(3)/MCL (ref 0–0.9)
EOSINOPHIL NFR BLD AUTO: 1.5 %
ERYTHROCYTE [DISTWIDTH] IN BLOOD BY AUTOMATED COUNT: 12.9 % (ref 11.5–17)
EST. AVERAGE GLUCOSE BLD GHB EST-MCNC: 108.3 MG/DL
GFR SERPLBLD CREATININE-BSD FMLA CKD-EPI: >60 MLS/MIN/1.73/M2
GLOBULIN SER-MCNC: 2.7 GM/DL (ref 2.4–3.5)
GLUCOSE SERPL-MCNC: 99 MG/DL (ref 74–100)
GLUCOSE UR QL STRIP.AUTO: NORMAL MG/DL
HAV IGM SERPL QL IA: NONREACTIVE
HBA1C MFR BLD: 5.4 %
HBV CORE IGM SERPL QL IA: NONREACTIVE
HBV SURFACE AG SERPL QL IA: NONREACTIVE
HCT VFR BLD AUTO: 45.2 % (ref 42–52)
HCV AB SERPL QL IA: NONREACTIVE
HDLC SERPL-MCNC: 44 MG/DL (ref 35–60)
HGB BLD-MCNC: 14.9 GM/DL (ref 14–18)
HIV 1+2 AB+HIV1 P24 AG SERPL QL IA: NONREACTIVE
HYALINE CASTS #/AREA URNS LPF: ABNORMAL /LPF
IMM GRANULOCYTES # BLD AUTO: 0.01 X10(3)/MCL (ref 0–0.04)
IMM GRANULOCYTES NFR BLD AUTO: 0.1 %
KETONES UR QL STRIP.AUTO: NEGATIVE MG/DL
LDLC SERPL CALC-MCNC: 134 MG/DL (ref 50–140)
LEUKOCYTE ESTERASE UR QL STRIP.AUTO: NEGATIVE UNIT/L
LYMPHOCYTES # BLD AUTO: 2.33 X10(3)/MCL (ref 0.6–4.6)
LYMPHOCYTES NFR BLD AUTO: 32.2 %
MCH RBC QN AUTO: 30.3 PG (ref 27–31)
MCHC RBC AUTO-ENTMCNC: 33 MG/DL (ref 33–36)
MCV RBC AUTO: 91.9 FL (ref 80–94)
MONOCYTES # BLD AUTO: 0.57 X10(3)/MCL (ref 0.1–1.3)
MONOCYTES NFR BLD AUTO: 7.9 %
MUCOUS THREADS URNS QL MICRO: ABNORMAL /LPF
NEUTROPHILS # BLD AUTO: 4.2 X10(3)/MCL (ref 2.1–9.2)
NEUTROPHILS NFR BLD AUTO: 57.5 %
NITRITE UR QL STRIP.AUTO: NEGATIVE
NRBC BLD AUTO-RTO: 0 %
PH UR STRIP.AUTO: 5.5 [PH]
PLATELET # BLD AUTO: 185 X10(3)/MCL (ref 130–400)
PMV BLD AUTO: 12.2 FL (ref 7.4–10.4)
POTASSIUM SERPL-SCNC: 5.4 MMOL/L (ref 3.5–5.1)
PROT SERPL-MCNC: 7.2 GM/DL (ref 6.4–8.3)
PROT UR QL STRIP.AUTO: NEGATIVE MG/DL
RBC # BLD AUTO: 4.92 X10(6)/MCL (ref 4.7–6.1)
RBC #/AREA URNS AUTO: ABNORMAL /HPF
RBC UR QL AUTO: NEGATIVE UNIT/L
SODIUM SERPL-SCNC: 142 MMOL/L (ref 136–145)
SP GR UR STRIP.AUTO: 1.03
SQUAMOUS #/AREA URNS LPF: ABNORMAL /HPF
T4 FREE SERPL-MCNC: 1 NG/DL (ref 0.7–1.48)
TRIGL SERPL-MCNC: 131 MG/DL (ref 34–140)
TSH SERPL-ACNC: 0.68 UIU/ML (ref 0.35–4.94)
UROBILINOGEN UR STRIP-ACNC: NORMAL MG/DL
VLDLC SERPL CALC-MCNC: 26 MG/DL
WBC # SPEC AUTO: 7.2 X10(3)/MCL (ref 4.5–11.5)
WBC #/AREA URNS AUTO: ABNORMAL /HPF

## 2022-10-24 PROCEDURE — 85025 COMPLETE CBC W/AUTO DIFF WBC: CPT | Performed by: STUDENT IN AN ORGANIZED HEALTH CARE EDUCATION/TRAINING PROGRAM

## 2022-10-24 PROCEDURE — 99214 PR OFFICE/OUTPT VISIT, EST, LEVL IV, 30-39 MIN: ICD-10-PCS | Mod: S$PBB,,, | Performed by: STUDENT IN AN ORGANIZED HEALTH CARE EDUCATION/TRAINING PROGRAM

## 2022-10-24 PROCEDURE — 87389 HIV-1 AG W/HIV-1&-2 AB AG IA: CPT | Performed by: STUDENT IN AN ORGANIZED HEALTH CARE EDUCATION/TRAINING PROGRAM

## 2022-10-24 PROCEDURE — 83036 HEMOGLOBIN GLYCOSYLATED A1C: CPT | Performed by: STUDENT IN AN ORGANIZED HEALTH CARE EDUCATION/TRAINING PROGRAM

## 2022-10-24 PROCEDURE — 80074 ACUTE HEPATITIS PANEL: CPT | Performed by: STUDENT IN AN ORGANIZED HEALTH CARE EDUCATION/TRAINING PROGRAM

## 2022-10-24 PROCEDURE — 3078F DIAST BP <80 MM HG: CPT | Mod: CPTII,,, | Performed by: STUDENT IN AN ORGANIZED HEALTH CARE EDUCATION/TRAINING PROGRAM

## 2022-10-24 PROCEDURE — 80061 LIPID PANEL: CPT | Performed by: STUDENT IN AN ORGANIZED HEALTH CARE EDUCATION/TRAINING PROGRAM

## 2022-10-24 PROCEDURE — 99214 OFFICE O/P EST MOD 30 MIN: CPT | Mod: PBBFAC,PN | Performed by: STUDENT IN AN ORGANIZED HEALTH CARE EDUCATION/TRAINING PROGRAM

## 2022-10-24 PROCEDURE — 81001 URINALYSIS AUTO W/SCOPE: CPT | Performed by: STUDENT IN AN ORGANIZED HEALTH CARE EDUCATION/TRAINING PROGRAM

## 2022-10-24 PROCEDURE — 3074F SYST BP LT 130 MM HG: CPT | Mod: CPTII,,, | Performed by: STUDENT IN AN ORGANIZED HEALTH CARE EDUCATION/TRAINING PROGRAM

## 2022-10-24 PROCEDURE — 3078F PR MOST RECENT DIASTOLIC BLOOD PRESSURE < 80 MM HG: ICD-10-PCS | Mod: CPTII,,, | Performed by: STUDENT IN AN ORGANIZED HEALTH CARE EDUCATION/TRAINING PROGRAM

## 2022-10-24 PROCEDURE — 3074F PR MOST RECENT SYSTOLIC BLOOD PRESSURE < 130 MM HG: ICD-10-PCS | Mod: CPTII,,, | Performed by: STUDENT IN AN ORGANIZED HEALTH CARE EDUCATION/TRAINING PROGRAM

## 2022-10-24 PROCEDURE — 84443 ASSAY THYROID STIM HORMONE: CPT | Performed by: STUDENT IN AN ORGANIZED HEALTH CARE EDUCATION/TRAINING PROGRAM

## 2022-10-24 PROCEDURE — 84439 ASSAY OF FREE THYROXINE: CPT | Performed by: STUDENT IN AN ORGANIZED HEALTH CARE EDUCATION/TRAINING PROGRAM

## 2022-10-24 PROCEDURE — 1159F MED LIST DOCD IN RCRD: CPT | Mod: CPTII,,, | Performed by: STUDENT IN AN ORGANIZED HEALTH CARE EDUCATION/TRAINING PROGRAM

## 2022-10-24 PROCEDURE — 36415 COLL VENOUS BLD VENIPUNCTURE: CPT | Performed by: STUDENT IN AN ORGANIZED HEALTH CARE EDUCATION/TRAINING PROGRAM

## 2022-10-24 PROCEDURE — 1159F PR MEDICATION LIST DOCUMENTED IN MEDICAL RECORD: ICD-10-PCS | Mod: CPTII,,, | Performed by: STUDENT IN AN ORGANIZED HEALTH CARE EDUCATION/TRAINING PROGRAM

## 2022-10-24 PROCEDURE — 80053 COMPREHEN METABOLIC PANEL: CPT | Performed by: STUDENT IN AN ORGANIZED HEALTH CARE EDUCATION/TRAINING PROGRAM

## 2022-10-24 PROCEDURE — 99214 OFFICE O/P EST MOD 30 MIN: CPT | Mod: S$PBB,,, | Performed by: STUDENT IN AN ORGANIZED HEALTH CARE EDUCATION/TRAINING PROGRAM

## 2022-10-24 RX ORDER — PREDNISONE 20 MG/1
20 TABLET ORAL 2 TIMES DAILY
Qty: 14 TABLET | Refills: 0 | Status: SHIPPED | OUTPATIENT
Start: 2022-10-24 | End: 2022-10-31
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
## 2022-10-25 LAB — PATH REV: NORMAL

## 2022-11-15 ENCOUNTER — OFFICE VISIT (OUTPATIENT)
Dept: OPHTHALMOLOGY | Facility: CLINIC | Age: 47
End: 2022-11-15
Payer: MEDICAID

## 2022-11-15 VITALS — WEIGHT: 173 LBS | BODY MASS INDEX: 27.15 KG/M2 | HEIGHT: 67 IN

## 2022-11-15 DIAGNOSIS — H04.123 DRY EYES, BILATERAL: ICD-10-CM

## 2022-11-15 DIAGNOSIS — H52.203 MYOPIA WITH ASTIGMATISM AND PRESBYOPIA, BILATERAL: Primary | ICD-10-CM

## 2022-11-15 DIAGNOSIS — H52.13 MYOPIA WITH ASTIGMATISM AND PRESBYOPIA, BILATERAL: Primary | ICD-10-CM

## 2022-11-15 DIAGNOSIS — H52.4 MYOPIA WITH ASTIGMATISM AND PRESBYOPIA, BILATERAL: Primary | ICD-10-CM

## 2022-11-15 PROCEDURE — 99213 OFFICE O/P EST LOW 20 MIN: CPT | Mod: PBBFAC,PO

## 2022-11-15 RX ORDER — DICLOFENAC SODIUM 75 MG/1
75 TABLET, DELAYED RELEASE ORAL
COMMUNITY
Start: 2022-03-29 | End: 2023-06-08

## 2022-11-15 RX ORDER — CYCLOBENZAPRINE HCL 10 MG
10 TABLET ORAL
COMMUNITY
Start: 2022-03-18 | End: 2023-01-30 | Stop reason: SDUPTHER

## 2022-11-15 NOTE — PROGRESS NOTES
HPI    RTC 3 mos DFE  Last edited by Danyelle Mann MA on 11/15/2022  2:39 PM.        Assessment /Plan     For exam results, see Encounter Report.    Myopia with astigmatism and presbyopia, bilateral    Dry eyes, bilateral      1. Myopia with astigmatism  - MRx provided last visited, printed again 11/15/22     2. Dry eye syndrome  - WC and ATs QID    RTC 1 year annual exam

## 2022-11-17 LAB — NONINV COLON CA DNA+OCC BLD SCRN STL QL: NEGATIVE

## 2023-01-30 ENCOUNTER — OFFICE VISIT (OUTPATIENT)
Dept: FAMILY MEDICINE | Facility: CLINIC | Age: 48
End: 2023-01-30
Payer: MEDICAID

## 2023-01-30 VITALS
TEMPERATURE: 98 F | HEIGHT: 67 IN | SYSTOLIC BLOOD PRESSURE: 128 MMHG | RESPIRATION RATE: 20 BRPM | BODY MASS INDEX: 26.53 KG/M2 | OXYGEN SATURATION: 98 % | DIASTOLIC BLOOD PRESSURE: 88 MMHG | HEART RATE: 89 BPM | WEIGHT: 169 LBS

## 2023-01-30 DIAGNOSIS — B96.89 BACTERIAL SINUSITIS: ICD-10-CM

## 2023-01-30 DIAGNOSIS — J32.9 BACTERIAL SINUSITIS: ICD-10-CM

## 2023-01-30 DIAGNOSIS — Z87.19 HISTORY OF INGUINAL HERNIA REPAIR: ICD-10-CM

## 2023-01-30 DIAGNOSIS — J06.9 UPPER RESPIRATORY INFECTION, ACUTE: ICD-10-CM

## 2023-01-30 DIAGNOSIS — Z98.890 HISTORY OF HERNIA REPAIR: ICD-10-CM

## 2023-01-30 DIAGNOSIS — M25.512 CHRONIC PAIN OF BOTH SHOULDERS: ICD-10-CM

## 2023-01-30 DIAGNOSIS — R50.9 FEVER, UNSPECIFIED FEVER CAUSE: ICD-10-CM

## 2023-01-30 DIAGNOSIS — H60.392 OTHER INFECTIVE ACUTE OTITIS EXTERNA OF LEFT EAR: ICD-10-CM

## 2023-01-30 DIAGNOSIS — J32.9 SINUSITIS, UNSPECIFIED CHRONICITY, UNSPECIFIED LOCATION: ICD-10-CM

## 2023-01-30 DIAGNOSIS — M05.80 POLYARTHRITIS WITH POSITIVE RHEUMATOID FACTOR: Primary | ICD-10-CM

## 2023-01-30 DIAGNOSIS — M25.511 CHRONIC PAIN OF BOTH SHOULDERS: ICD-10-CM

## 2023-01-30 DIAGNOSIS — Z98.890 HISTORY OF INGUINAL HERNIA REPAIR: ICD-10-CM

## 2023-01-30 DIAGNOSIS — G89.29 CHRONIC PAIN OF BOTH SHOULDERS: ICD-10-CM

## 2023-01-30 DIAGNOSIS — Z87.19 HISTORY OF HERNIA REPAIR: ICD-10-CM

## 2023-01-30 DIAGNOSIS — H60.332 ACUTE SWIMMER'S EAR OF LEFT SIDE: ICD-10-CM

## 2023-01-30 LAB
FLUAV AG UPPER RESP QL IA.RAPID: NOT DETECTED
FLUBV AG UPPER RESP QL IA.RAPID: NOT DETECTED
SARS-COV-2 RNA RESP QL NAA+PROBE: NOT DETECTED
STREP A PCR (OHS): NOT DETECTED

## 2023-01-30 PROCEDURE — 3079F DIAST BP 80-89 MM HG: CPT | Mod: CPTII,,, | Performed by: STUDENT IN AN ORGANIZED HEALTH CARE EDUCATION/TRAINING PROGRAM

## 2023-01-30 PROCEDURE — 99214 OFFICE O/P EST MOD 30 MIN: CPT | Mod: S$PBB,,, | Performed by: STUDENT IN AN ORGANIZED HEALTH CARE EDUCATION/TRAINING PROGRAM

## 2023-01-30 PROCEDURE — 3074F PR MOST RECENT SYSTOLIC BLOOD PRESSURE < 130 MM HG: ICD-10-PCS | Mod: CPTII,,, | Performed by: STUDENT IN AN ORGANIZED HEALTH CARE EDUCATION/TRAINING PROGRAM

## 2023-01-30 PROCEDURE — 3074F SYST BP LT 130 MM HG: CPT | Mod: CPTII,,, | Performed by: STUDENT IN AN ORGANIZED HEALTH CARE EDUCATION/TRAINING PROGRAM

## 2023-01-30 PROCEDURE — 87651 STREP A DNA AMP PROBE: CPT | Performed by: STUDENT IN AN ORGANIZED HEALTH CARE EDUCATION/TRAINING PROGRAM

## 2023-01-30 PROCEDURE — 99214 PR OFFICE/OUTPT VISIT, EST, LEVL IV, 30-39 MIN: ICD-10-PCS | Mod: S$PBB,,, | Performed by: STUDENT IN AN ORGANIZED HEALTH CARE EDUCATION/TRAINING PROGRAM

## 2023-01-30 PROCEDURE — 1159F PR MEDICATION LIST DOCUMENTED IN MEDICAL RECORD: ICD-10-PCS | Mod: CPTII,,, | Performed by: STUDENT IN AN ORGANIZED HEALTH CARE EDUCATION/TRAINING PROGRAM

## 2023-01-30 PROCEDURE — 3008F BODY MASS INDEX DOCD: CPT | Mod: CPTII,,, | Performed by: STUDENT IN AN ORGANIZED HEALTH CARE EDUCATION/TRAINING PROGRAM

## 2023-01-30 PROCEDURE — 3008F PR BODY MASS INDEX (BMI) DOCUMENTED: ICD-10-PCS | Mod: CPTII,,, | Performed by: STUDENT IN AN ORGANIZED HEALTH CARE EDUCATION/TRAINING PROGRAM

## 2023-01-30 PROCEDURE — 1159F MED LIST DOCD IN RCRD: CPT | Mod: CPTII,,, | Performed by: STUDENT IN AN ORGANIZED HEALTH CARE EDUCATION/TRAINING PROGRAM

## 2023-01-30 PROCEDURE — 0240U COVID/FLU A&B PCR: CPT | Performed by: STUDENT IN AN ORGANIZED HEALTH CARE EDUCATION/TRAINING PROGRAM

## 2023-01-30 PROCEDURE — 3079F PR MOST RECENT DIASTOLIC BLOOD PRESSURE 80-89 MM HG: ICD-10-PCS | Mod: CPTII,,, | Performed by: STUDENT IN AN ORGANIZED HEALTH CARE EDUCATION/TRAINING PROGRAM

## 2023-01-30 PROCEDURE — 99214 OFFICE O/P EST MOD 30 MIN: CPT | Mod: PBBFAC,PN | Performed by: STUDENT IN AN ORGANIZED HEALTH CARE EDUCATION/TRAINING PROGRAM

## 2023-01-30 RX ORDER — NEOMYCIN SULFATE, POLYMYXIN B SULFATE AND HYDROCORTISONE 10; 3.5; 1 MG/ML; MG/ML; [USP'U]/ML
4 SUSPENSION/ DROPS AURICULAR (OTIC) 3 TIMES DAILY
Qty: 10 ML | Refills: 0 | Status: SHIPPED | OUTPATIENT
Start: 2023-01-30 | End: 2023-02-09

## 2023-01-30 RX ORDER — CYCLOBENZAPRINE HCL 10 MG
10 TABLET ORAL 3 TIMES DAILY PRN
Qty: 90 TABLET | Refills: 6 | Status: SHIPPED | OUTPATIENT
Start: 2023-01-30 | End: 2023-05-02

## 2023-01-30 RX ORDER — PREDNISONE 20 MG/1
20 TABLET ORAL 2 TIMES DAILY
Qty: 14 TABLET | Refills: 0 | Status: SHIPPED | OUTPATIENT
Start: 2023-01-30 | End: 2023-02-06

## 2023-01-30 RX ORDER — AMOXICILLIN AND CLAVULANATE POTASSIUM 875; 125 MG/1; MG/1
1 TABLET, FILM COATED ORAL EVERY 12 HOURS
Qty: 20 TABLET | Refills: 0 | Status: SHIPPED | OUTPATIENT
Start: 2023-01-30 | End: 2023-02-09

## 2023-01-30 RX ORDER — FLUTICASONE PROPIONATE 50 MCG
1 SPRAY, SUSPENSION (ML) NASAL DAILY
Qty: 18.2 ML | Refills: 6 | Status: SHIPPED | OUTPATIENT
Start: 2023-01-30 | End: 2023-05-02 | Stop reason: SDUPTHER
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
## 2023-02-01 ENCOUNTER — TELEPHONE (OUTPATIENT)
Dept: FAMILY MEDICINE | Facility: CLINIC | Age: 48
End: 2023-02-01

## 2023-05-02 ENCOUNTER — OFFICE VISIT (OUTPATIENT)
Dept: FAMILY MEDICINE | Facility: CLINIC | Age: 48
End: 2023-05-02
Payer: MEDICAID

## 2023-05-02 VITALS
TEMPERATURE: 98 F | SYSTOLIC BLOOD PRESSURE: 123 MMHG | HEART RATE: 67 BPM | OXYGEN SATURATION: 100 % | DIASTOLIC BLOOD PRESSURE: 82 MMHG | HEIGHT: 67 IN | BODY MASS INDEX: 26.49 KG/M2 | WEIGHT: 168.81 LBS

## 2023-05-02 DIAGNOSIS — J30.1 SEASONAL ALLERGIC RHINITIS DUE TO POLLEN: ICD-10-CM

## 2023-05-02 DIAGNOSIS — N18.2 STAGE 2 CHRONIC KIDNEY DISEASE: ICD-10-CM

## 2023-05-02 DIAGNOSIS — F17.200 SMOKER: ICD-10-CM

## 2023-05-02 DIAGNOSIS — G89.29 CHRONIC PAIN OF BOTH SHOULDERS: ICD-10-CM

## 2023-05-02 DIAGNOSIS — Z12.5 PROSTATE CANCER SCREENING: ICD-10-CM

## 2023-05-02 DIAGNOSIS — M25.512 CHRONIC PAIN OF BOTH SHOULDERS: ICD-10-CM

## 2023-05-02 DIAGNOSIS — M25.511 CHRONIC PAIN OF BOTH SHOULDERS: ICD-10-CM

## 2023-05-02 DIAGNOSIS — M05.80 POLYARTHRITIS WITH POSITIVE RHEUMATOID FACTOR: ICD-10-CM

## 2023-05-02 DIAGNOSIS — J06.9 UPPER RESPIRATORY INFECTION, ACUTE: ICD-10-CM

## 2023-05-02 DIAGNOSIS — R79.89 ELEVATED SERUM CREATININE: Primary | ICD-10-CM

## 2023-05-02 LAB
ANION GAP SERPL CALC-SCNC: 9 MEQ/L
BUN SERPL-MCNC: 10.9 MG/DL (ref 8.9–20.6)
CALCIUM SERPL-MCNC: 9.8 MG/DL (ref 8.4–10.2)
CHLORIDE SERPL-SCNC: 107 MMOL/L (ref 98–107)
CO2 SERPL-SCNC: 25 MMOL/L (ref 22–29)
CREAT SERPL-MCNC: 1.29 MG/DL (ref 0.73–1.18)
CREAT/UREA NIT SERPL: 8
GFR SERPLBLD CREATININE-BSD FMLA CKD-EPI: >60 MLS/MIN/1.73/M2
GLUCOSE SERPL-MCNC: 104 MG/DL (ref 74–100)
POTASSIUM SERPL-SCNC: 4.4 MMOL/L (ref 3.5–5.1)
PSA SERPL-MCNC: 0.49 NG/ML
SODIUM SERPL-SCNC: 141 MMOL/L (ref 136–145)

## 2023-05-02 PROCEDURE — 1159F MED LIST DOCD IN RCRD: CPT | Mod: CPTII,,, | Performed by: STUDENT IN AN ORGANIZED HEALTH CARE EDUCATION/TRAINING PROGRAM

## 2023-05-02 PROCEDURE — 99213 OFFICE O/P EST LOW 20 MIN: CPT | Mod: PBBFAC,PN | Performed by: STUDENT IN AN ORGANIZED HEALTH CARE EDUCATION/TRAINING PROGRAM

## 2023-05-02 PROCEDURE — 99406 BEHAV CHNG SMOKING 3-10 MIN: CPT | Mod: S$PBB,,, | Performed by: STUDENT IN AN ORGANIZED HEALTH CARE EDUCATION/TRAINING PROGRAM

## 2023-05-02 PROCEDURE — 99214 PR OFFICE/OUTPT VISIT, EST, LEVL IV, 30-39 MIN: ICD-10-PCS | Mod: S$PBB,25,, | Performed by: STUDENT IN AN ORGANIZED HEALTH CARE EDUCATION/TRAINING PROGRAM

## 2023-05-02 PROCEDURE — 3079F PR MOST RECENT DIASTOLIC BLOOD PRESSURE 80-89 MM HG: ICD-10-PCS | Mod: CPTII,,, | Performed by: STUDENT IN AN ORGANIZED HEALTH CARE EDUCATION/TRAINING PROGRAM

## 2023-05-02 PROCEDURE — 99214 OFFICE O/P EST MOD 30 MIN: CPT | Mod: S$PBB,25,, | Performed by: STUDENT IN AN ORGANIZED HEALTH CARE EDUCATION/TRAINING PROGRAM

## 2023-05-02 PROCEDURE — 99406 PR TOBACCO USE CESSATION INTERMEDIATE 3-10 MINUTES: ICD-10-PCS | Mod: S$PBB,,, | Performed by: STUDENT IN AN ORGANIZED HEALTH CARE EDUCATION/TRAINING PROGRAM

## 2023-05-02 PROCEDURE — 1159F PR MEDICATION LIST DOCUMENTED IN MEDICAL RECORD: ICD-10-PCS | Mod: CPTII,,, | Performed by: STUDENT IN AN ORGANIZED HEALTH CARE EDUCATION/TRAINING PROGRAM

## 2023-05-02 PROCEDURE — 36415 COLL VENOUS BLD VENIPUNCTURE: CPT | Performed by: STUDENT IN AN ORGANIZED HEALTH CARE EDUCATION/TRAINING PROGRAM

## 2023-05-02 PROCEDURE — 80048 BASIC METABOLIC PNL TOTAL CA: CPT | Performed by: STUDENT IN AN ORGANIZED HEALTH CARE EDUCATION/TRAINING PROGRAM

## 2023-05-02 PROCEDURE — 3074F PR MOST RECENT SYSTOLIC BLOOD PRESSURE < 130 MM HG: ICD-10-PCS | Mod: CPTII,,, | Performed by: STUDENT IN AN ORGANIZED HEALTH CARE EDUCATION/TRAINING PROGRAM

## 2023-05-02 PROCEDURE — 3079F DIAST BP 80-89 MM HG: CPT | Mod: CPTII,,, | Performed by: STUDENT IN AN ORGANIZED HEALTH CARE EDUCATION/TRAINING PROGRAM

## 2023-05-02 PROCEDURE — 84153 ASSAY OF PSA TOTAL: CPT | Performed by: STUDENT IN AN ORGANIZED HEALTH CARE EDUCATION/TRAINING PROGRAM

## 2023-05-02 PROCEDURE — 3008F PR BODY MASS INDEX (BMI) DOCUMENTED: ICD-10-PCS | Mod: CPTII,,, | Performed by: STUDENT IN AN ORGANIZED HEALTH CARE EDUCATION/TRAINING PROGRAM

## 2023-05-02 PROCEDURE — 3074F SYST BP LT 130 MM HG: CPT | Mod: CPTII,,, | Performed by: STUDENT IN AN ORGANIZED HEALTH CARE EDUCATION/TRAINING PROGRAM

## 2023-05-02 PROCEDURE — 3008F BODY MASS INDEX DOCD: CPT | Mod: CPTII,,, | Performed by: STUDENT IN AN ORGANIZED HEALTH CARE EDUCATION/TRAINING PROGRAM

## 2023-05-02 RX ORDER — CETIRIZINE HYDROCHLORIDE 10 MG/1
10 TABLET ORAL DAILY
Qty: 90 TABLET | Refills: 3 | Status: SHIPPED | OUTPATIENT
Start: 2023-05-02 | End: 2024-02-20

## 2023-05-02 RX ORDER — FLUTICASONE PROPIONATE 50 MCG
1 SPRAY, SUSPENSION (ML) NASAL DAILY
Qty: 18.2 ML | Refills: 11 | Status: SHIPPED | OUTPATIENT
Start: 2023-05-02 | End: 2024-02-20
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
## 2023-05-28 ENCOUNTER — HOSPITAL ENCOUNTER (EMERGENCY)
Facility: HOSPITAL | Age: 48
Discharge: HOME | End: 2023-05-28
Attending: EMERGENCY MEDICINE
Payer: MEDICAID

## 2023-05-28 VITALS
OXYGEN SATURATION: 100 % | HEIGHT: 67 IN | HEART RATE: 53 BPM | WEIGHT: 168.88 LBS | TEMPERATURE: 98 F | SYSTOLIC BLOOD PRESSURE: 122 MMHG | DIASTOLIC BLOOD PRESSURE: 86 MMHG | RESPIRATION RATE: 20 BRPM | BODY MASS INDEX: 26.51 KG/M2

## 2023-05-28 DIAGNOSIS — S39.012A STRAIN OF LUMBAR REGION, INITIAL ENCOUNTER: Primary | ICD-10-CM

## 2023-05-28 DIAGNOSIS — N18.9 CHRONIC RENAL IMPAIRMENT, UNSPECIFIED CKD STAGE: ICD-10-CM

## 2023-05-28 LAB
ALBUMIN SERPL-MCNC: 4.2 G/DL (ref 3.5–5)
ALBUMIN/GLOB SERPL: 1.6 RATIO (ref 1.1–2)
ALP SERPL-CCNC: 64 UNIT/L (ref 40–150)
ALT SERPL-CCNC: 11 UNIT/L (ref 0–55)
APPEARANCE UR: CLEAR
AST SERPL-CCNC: 13 UNIT/L (ref 5–34)
BACTERIA #/AREA URNS AUTO: ABNORMAL /HPF
BASOPHILS # BLD AUTO: 0.06 X10(3)/MCL
BASOPHILS NFR BLD AUTO: 0.8 %
BILIRUB UR QL STRIP.AUTO: NEGATIVE MG/DL
BILIRUBIN DIRECT+TOT PNL SERPL-MCNC: 0.2 MG/DL
BUN SERPL-MCNC: 9.7 MG/DL (ref 8.9–20.6)
CALCIUM SERPL-MCNC: 9.6 MG/DL (ref 8.4–10.2)
CHLORIDE SERPL-SCNC: 111 MMOL/L (ref 98–107)
CO2 SERPL-SCNC: 23 MMOL/L (ref 22–29)
COLOR UR: YELLOW
CREAT SERPL-MCNC: 1.26 MG/DL (ref 0.73–1.18)
EOSINOPHIL # BLD AUTO: 0.16 X10(3)/MCL (ref 0–0.9)
EOSINOPHIL NFR BLD AUTO: 2.1 %
ERYTHROCYTE [DISTWIDTH] IN BLOOD BY AUTOMATED COUNT: 13.2 % (ref 11.5–17)
GFR SERPLBLD CREATININE-BSD FMLA CKD-EPI: >60 MLS/MIN/1.73/M2
GLOBULIN SER-MCNC: 2.6 GM/DL (ref 2.4–3.5)
GLUCOSE SERPL-MCNC: 108 MG/DL (ref 74–100)
GLUCOSE UR QL STRIP.AUTO: NORMAL MG/DL
HCT VFR BLD AUTO: 41.9 % (ref 42–52)
HGB BLD-MCNC: 13.7 G/DL (ref 14–18)
HYALINE CASTS #/AREA URNS LPF: ABNORMAL /LPF
IMM GRANULOCYTES # BLD AUTO: 0.02 X10(3)/MCL (ref 0–0.04)
IMM GRANULOCYTES NFR BLD AUTO: 0.3 %
KETONES UR QL STRIP.AUTO: ABNORMAL MG/DL
LEUKOCYTE ESTERASE UR QL STRIP.AUTO: NEGATIVE UNIT/L
LYMPHOCYTES # BLD AUTO: 2.32 X10(3)/MCL (ref 0.6–4.6)
LYMPHOCYTES NFR BLD AUTO: 30.5 %
MCH RBC QN AUTO: 29.9 PG (ref 27–31)
MCHC RBC AUTO-ENTMCNC: 32.7 G/DL (ref 33–36)
MCV RBC AUTO: 91.5 FL (ref 80–94)
MONOCYTES # BLD AUTO: 0.55 X10(3)/MCL (ref 0.1–1.3)
MONOCYTES NFR BLD AUTO: 7.2 %
MUCOUS THREADS URNS QL MICRO: ABNORMAL /LPF
NEUTROPHILS # BLD AUTO: 4.49 X10(3)/MCL (ref 2.1–9.2)
NEUTROPHILS NFR BLD AUTO: 59.1 %
NITRITE UR QL STRIP.AUTO: NEGATIVE
NRBC BLD AUTO-RTO: 0 %
PH UR STRIP.AUTO: 5.5 [PH]
PLATELET # BLD AUTO: 153 X10(3)/MCL (ref 130–400)
PMV BLD AUTO: 12 FL (ref 7.4–10.4)
POTASSIUM SERPL-SCNC: 4.5 MMOL/L (ref 3.5–5.1)
PROT SERPL-MCNC: 6.8 GM/DL (ref 6.4–8.3)
PROT UR QL STRIP.AUTO: ABNORMAL MG/DL
RBC # BLD AUTO: 4.58 X10(6)/MCL (ref 4.7–6.1)
RBC #/AREA URNS AUTO: ABNORMAL /HPF
RBC UR QL AUTO: NEGATIVE UNIT/L
SODIUM SERPL-SCNC: 143 MMOL/L (ref 136–145)
SP GR UR STRIP.AUTO: 1.03
SQUAMOUS #/AREA URNS LPF: ABNORMAL /HPF
UROBILINOGEN UR STRIP-ACNC: ABNORMAL MG/DL
WBC # SPEC AUTO: 7.6 X10(3)/MCL (ref 4.5–11.5)
WBC #/AREA URNS AUTO: ABNORMAL /HPF

## 2023-05-28 PROCEDURE — 99285 EMERGENCY DEPT VISIT HI MDM: CPT | Mod: 25

## 2023-05-28 PROCEDURE — 85025 COMPLETE CBC W/AUTO DIFF WBC: CPT | Performed by: EMERGENCY MEDICINE

## 2023-05-28 PROCEDURE — 80053 COMPREHEN METABOLIC PANEL: CPT | Performed by: EMERGENCY MEDICINE

## 2023-05-28 PROCEDURE — 63600175 PHARM REV CODE 636 W HCPCS: Performed by: EMERGENCY MEDICINE

## 2023-05-28 PROCEDURE — 81001 URINALYSIS AUTO W/SCOPE: CPT | Performed by: EMERGENCY MEDICINE

## 2023-05-28 PROCEDURE — 96374 THER/PROPH/DIAG INJ IV PUSH: CPT

## 2023-05-28 PROCEDURE — 25000003 PHARM REV CODE 250: Performed by: EMERGENCY MEDICINE

## 2023-05-28 RX ORDER — TRAMADOL HYDROCHLORIDE 50 MG/1
100 TABLET ORAL EVERY 12 HOURS PRN
Qty: 28 TABLET | Refills: 0 | Status: SHIPPED | OUTPATIENT
Start: 2023-05-28 | End: 2023-06-08

## 2023-05-28 RX ORDER — KETOROLAC TROMETHAMINE 30 MG/ML
15 INJECTION, SOLUTION INTRAMUSCULAR; INTRAVENOUS
Status: COMPLETED | OUTPATIENT
Start: 2023-05-28 | End: 2023-05-28

## 2023-05-28 RX ORDER — TRAMADOL HYDROCHLORIDE 50 MG/1
100 TABLET ORAL
Status: COMPLETED | OUTPATIENT
Start: 2023-05-28 | End: 2023-05-28

## 2023-05-28 RX ADMIN — TRAMADOL HYDROCHLORIDE 100 MG: 50 TABLET, COATED ORAL at 09:05

## 2023-05-28 RX ADMIN — KETOROLAC TROMETHAMINE 15 MG: 30 INJECTION, SOLUTION INTRAMUSCULAR; INTRAVENOUS at 07:05

## 2023-05-28 NOTE — ED PROVIDER NOTES
Encounter Date: 5/28/2023       History     Chief Complaint   Patient presents with    Abdominal Pain    Back Pain     C/o pain over bladder area radiating to kidney area x 2 days. Hx of kidney problems     Pain for 2 days, he indicates the bilateral suprapubic, low abdomen, and pelvic region radiating to his low back bilaterally, worse with walking, somewhat worse with bending at the hips.  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  No fever, chills, sweats, urinary symptoms, nausea, vomiting, or other complaints.  No prior such as tree.  No history of kidney stone.  Underlying history is minimal includes arthritis, herniorrhaphy, and tobacco use with incidental orthopedic surgery.  No other complaints or problems.    The history is provided by the patient and a relative. No  was used.   Review of patient's allergies indicates:  No Known Allergies  Past Medical History:   Diagnosis Date    Arthritis      Past Surgical History:   Procedure Laterality Date    FRACTURE SURGERY  02/09/2021    Ankle surgery    HERNIA REPAIR  2001     Family History   Problem Relation Age of Onset    Kidney disease Mother     Heart disease Father     Arthritis Sister     Cancer Sister      Social History     Tobacco Use    Smoking status: Every Day     Packs/day: 0.50     Years: 20.00     Pack years: 10.00     Types: Cigarettes     Start date: 9/8/2000     Passive exposure: Never    Smokeless tobacco: Never   Substance Use Topics    Alcohol use: Never    Drug use: Yes     Frequency: 5.0 times per week     Types: Marijuana     Comment: everyday     Review of Systems   Constitutional:  Negative for chills and fever.   HENT:  Negative for congestion, facial swelling, nosebleeds and sinus pressure.    Eyes:  Negative for pain and redness.   Respiratory:  Negative for chest tightness, shortness  of breath and wheezing.    Cardiovascular:  Negative for chest pain, palpitations and leg swelling.   Gastrointestinal:  Positive for abdominal pain. Negative for abdominal distention, diarrhea, nausea and vomiting.   Endocrine: Negative for cold intolerance, polydipsia and polyphagia.   Genitourinary:  Negative for difficulty urinating, dysuria, frequency and hematuria.   Musculoskeletal:  Positive for back pain. Negative for arthralgias, myalgias and neck pain.   Skin:  Negative for color change and rash.   Neurological:  Negative for dizziness, weakness, numbness and headaches.   Hematological:  Negative for adenopathy. Does not bruise/bleed easily.   Psychiatric/Behavioral:  Negative for agitation and behavioral problems.    All other systems reviewed and are negative.    Physical Exam     Initial Vitals [05/28/23 0708]   BP Pulse Resp Temp SpO2   122/79 76 20 97.9 °F (36.6 °C) 100 %      MAP       --         Physical Exam    Nursing note and vitals reviewed.  Constitutional: He appears well-developed and well-nourished. He is not diaphoretic. No distress.   HENT:   Head: Normocephalic and atraumatic.   Mouth/Throat: Oropharynx is clear and moist. No oropharyngeal exudate.   Eyes: Conjunctivae and EOM are normal. Pupils are equal, round, and reactive to light. Right eye exhibits no discharge. Left eye exhibits no discharge. No scleral icterus.   Neck: Neck supple. No thyromegaly present. No tracheal deviation present. No JVD present.   Normal range of motion.  Cardiovascular:  Normal rate, regular rhythm and normal heart sounds.     Exam reveals no gallop and no friction rub.       No murmur heard.  Pulmonary/Chest: Breath sounds normal. No respiratory distress. He has no wheezes. He has no rhonchi. He has no rales. He exhibits no tenderness.   Abdominal: Abdomen is soft. Bowel sounds are normal. He exhibits no distension and no mass. There is no abdominal tenderness.   Indicates bilateral low abdomen and pelvis  as the area tenderness, no discrete point tenderness localized, no rebound or guarding.  Old well-healed right inguinal herniorrhaphy scar without recurrence.  Question right-sided flank pain to percussion. There is no rebound and no guarding.   Musculoskeletal:         General: Tenderness present. No edema. Normal range of motion.      Cervical back: Normal range of motion and neck supple.      Comments: Walk slowly due to apparent muscular discomfort in the bilateral low back and flank area.  Increased pain with right hip flexion but no discrete point tenderness.  No deformity.  Normal range of motion and flexibility throughout.      Lymphadenopathy:     He has no cervical adenopathy.   Neurological: He is alert and oriented to person, place, and time. He has normal strength. No cranial nerve deficit.   Skin: Skin is warm and dry. No rash noted. No erythema.   Psychiatric: He has a normal mood and affect. His behavior is normal. Judgment and thought content normal.       ED Course   Procedures  Labs Reviewed   COMPREHENSIVE METABOLIC PANEL - Abnormal; Notable for the following components:       Result Value    Chloride 111 (*)     Glucose Level 108 (*)     Creatinine 1.26 (*)     All other components within normal limits   URINALYSIS, REFLEX TO URINE CULTURE - Abnormal; Notable for the following components:    Protein, UA Trace (*)     Ketones, UA Trace (*)     Urobilinogen, UA 1+ (*)     Mucous, UA Few (*)     All other components within normal limits   CBC WITH DIFFERENTIAL - Abnormal; Notable for the following components:    RBC 4.58 (*)     Hgb 13.7 (*)     Hct 41.9 (*)     MCHC 32.7 (*)     MPV 12.0 (*)     All other components within normal limits   CBC W/ AUTO DIFFERENTIAL    Narrative:     The following orders were created for panel order CBC auto differential.  Procedure                               Abnormality         Status                     ---------                               -----------          ------                     CBC with Differential[[id number]]        Abnormal            Final result                 Please view results for these tests on the individual orders.   EXTRA TUBES    Narrative:     The following orders were created for panel order EXTRA TUBES.  Procedure                               Abnormality         Status                     ---------                               -----------         ------                     Light Blue Top Hold[[id number]]                              In process                 Red Top Hold[[id number]]                                     In process                 Gold Top Hold[[id number]]                                    In process                   Please view results for these tests on the individual orders.   LIGHT BLUE TOP HOLD   RED TOP HOLD   GOLD TOP HOLD          Imaging Results              CT Renal Stone Study ABD Pelvis WO (Final result)  Result time 05/28/23 08:39:32      Final result by Santi Lopez MD (05/28/23 08:39:32)                   Impression:      No acute abdominopelvic findings on this noncontrast scan.  No urinary tract calculi seen.      Electronically signed by: Santi Lopez  Date:    05/28/2023  Time:    08:39               Narrative:    EXAMINATION:  CT RENAL STONE STUDY ABD PELVIS WO    CLINICAL HISTORY:  Flank pain, kidney stone suspected;    TECHNIQUE:  Helical acquisition through the abdomen and pelvis without IV contrast.  Lack of contrast limits evaluation of solid organs and vascular structures .  Three plane reconstructions were provided for review.  mGycm. Automatic exposure control, adjustment of mA/kV or iterative reconstruction technique was used to reduce radiation.    COMPARISON:  No priors    FINDINGS:  The limited imaged lung bases are clear.    No acute findings noncontrast liver, gallbladder, spleen, pancreas or adrenals.    No hydronephrosis.  No urinary tract calculi.    No bowel obstruction. No  significant inflammatory changes of the bowel.  Normal appendix.  No free air.    Urinary bladder is not well distended.  No pelvic free fluid.  Abdominal aorta normal in caliber.  Minimal atherosclerotic disease.    No acute osseous findings.                                       Medications   traMADoL tablet 100 mg (has no administration in time range)   ketorolac injection 15 mg (15 mg Intravenous Given 5/28/23 0746)       9:57 AM Improved, generally stable overall.  Counseled in detail.  Very mild renal insufficiency on screening labs, counseled about this and to avoid nonsteroidal anti-inflammatories, rule longitudinal follow-up with primary care.  Evidence suggests that he has muscular strain from his occupation, routine pain control and expectant management.  Stable for discharge.        Medical Decision Making  Problems Addressed:  Chronic renal impairment, unspecified CKD stage: chronic illness or injury  Strain of lumbar region, initial encounter: acute illness or injury    Amount and/or Complexity of Data Reviewed  External Data Reviewed: labs.  Labs: ordered. Decision-making details documented in ED Course.  Radiology: ordered and independent interpretation performed. Decision-making details documented in ED Course.    Risk  Prescription drug management.                                   Clinical Impression:   Final diagnoses:  [S39.012A] Strain of lumbar region, initial encounter (Primary)  [N18.9] Chronic renal impairment, unspecified CKD stage        ED Disposition Condition    Discharge Stable          ED Prescriptions       Medication Sig Dispense Start Date End Date Auth. Provider    traMADoL (ULTRAM) 50 mg tablet Take 2 tablets (100 mg total) by mouth every 12 (twelve) hours as needed for Pain. 28 tablet 5/28/2023 -- Santi Dunham MD          Follow-up Information       Follow up With Specialties Details Why Contact Info    Leola Odom MD Family Medicine Schedule an appointment as soon  as possible for a visit   [address] [facility] [address]  [phone]      [facility] Emergency Medicine  As needed [address] [facility] [address] [phone]             Santi Dunham MD  05/28/23 0979

## 2023-05-28 NOTE — DISCHARGE INSTRUCTIONS
No serious abnormality discovered.      I believe this is all muscular pain, the medication as prescribed should help.      You have a very mild, early, and stable form of decreased kidney function.      Recommend that you follow-up with primary care to monitor this at least once or twice a year.      Avoid nonsteroidal anti-inflammatory medicines such as aspirin, Motrin, and Aleve.

## 2023-06-08 ENCOUNTER — HOSPITAL ENCOUNTER (OUTPATIENT)
Dept: RADIOLOGY | Facility: HOSPITAL | Age: 48
Discharge: HOME | End: 2023-06-08
Attending: STUDENT IN AN ORGANIZED HEALTH CARE EDUCATION/TRAINING PROGRAM
Payer: MEDICAID

## 2023-06-08 ENCOUNTER — OFFICE VISIT (OUTPATIENT)
Dept: ORTHOPEDICS | Facility: CLINIC | Age: 48
End: 2023-06-08
Payer: MEDICAID

## 2023-06-08 VITALS
HEART RATE: 78 BPM | HEIGHT: 66 IN | BODY MASS INDEX: 27 KG/M2 | SYSTOLIC BLOOD PRESSURE: 114 MMHG | DIASTOLIC BLOOD PRESSURE: 76 MMHG | WEIGHT: 168 LBS

## 2023-06-08 DIAGNOSIS — M19.011 PRIMARY OSTEOARTHRITIS OF BOTH SHOULDERS: ICD-10-CM

## 2023-06-08 DIAGNOSIS — M67.911 TENDINOPATHY OF RIGHT ROTATOR CUFF: ICD-10-CM

## 2023-06-08 DIAGNOSIS — M67.912 TENDINOPATHY OF LEFT ROTATOR CUFF: Primary | ICD-10-CM

## 2023-06-08 DIAGNOSIS — M25.811 IMPINGEMENT OF RIGHT SHOULDER: ICD-10-CM

## 2023-06-08 DIAGNOSIS — M19.012 PRIMARY OSTEOARTHRITIS OF BOTH SHOULDERS: ICD-10-CM

## 2023-06-08 DIAGNOSIS — M25.819 SHOULDER IMPINGEMENT: ICD-10-CM

## 2023-06-08 DIAGNOSIS — M25.511 CHRONIC PAIN OF BOTH SHOULDERS: ICD-10-CM

## 2023-06-08 DIAGNOSIS — M25.512 CHRONIC PAIN OF BOTH SHOULDERS: ICD-10-CM

## 2023-06-08 DIAGNOSIS — G89.29 CHRONIC PAIN OF BOTH SHOULDERS: ICD-10-CM

## 2023-06-08 PROBLEM — H60.332 ACUTE SWIMMER'S EAR OF LEFT SIDE: Status: RESOLVED | Noted: 2023-01-30 | Resolved: 2023-06-08

## 2023-06-08 PROCEDURE — 73030 X-RAY EXAM OF SHOULDER: CPT | Mod: TC,LT

## 2023-06-08 PROCEDURE — 99213 OFFICE O/P EST LOW 20 MIN: CPT | Mod: PBBFAC

## 2023-06-08 PROCEDURE — 20610 DRAIN/INJ JOINT/BURSA W/O US: CPT | Mod: 50,PBBFAC | Performed by: STUDENT IN AN ORGANIZED HEALTH CARE EDUCATION/TRAINING PROGRAM

## 2023-06-08 PROCEDURE — 73030 X-RAY EXAM OF SHOULDER: CPT | Mod: TC,RT

## 2023-06-08 RX ORDER — NEOMYCIN SULFATE, POLYMYXIN B SULFATE, HYDROCORTISONE 3.5; 10000; 1 MG/ML; [USP'U]/ML; MG/ML
SOLUTION/ DROPS AURICULAR (OTIC)
COMMUNITY
Start: 2023-01-30 | End: 2024-02-20

## 2023-06-08 RX ORDER — LIDOCAINE HYDROCHLORIDE 10 MG/ML
5 INJECTION, SOLUTION EPIDURAL; INFILTRATION; INTRACAUDAL; PERINEURAL
Status: COMPLETED | OUTPATIENT
Start: 2023-06-08 | End: 2023-06-08

## 2023-06-08 RX ORDER — TRIAMCINOLONE ACETONIDE 40 MG/ML
40 INJECTION, SUSPENSION INTRA-ARTICULAR; INTRAMUSCULAR ONCE
Status: COMPLETED | OUTPATIENT
Start: 2023-06-08 | End: 2023-06-08

## 2023-06-08 RX ORDER — MELOXICAM 7.5 MG/1
7.5 TABLET ORAL DAILY
Qty: 7 TABLET | Refills: 0 | Status: SHIPPED | OUTPATIENT
Start: 2023-06-08 | End: 2023-07-14

## 2023-06-08 RX ORDER — TRIAMCINOLONE ACETONIDE 40 MG/ML
40 INJECTION, SUSPENSION INTRA-ARTICULAR; INTRAMUSCULAR
Status: COMPLETED | OUTPATIENT
Start: 2023-06-08 | End: 2023-06-08

## 2023-06-08 RX ADMIN — TRIAMCINOLONE ACETONIDE 40 MG: 40 INJECTION, SUSPENSION INTRA-ARTICULAR; INTRAMUSCULAR at 11:06

## 2023-06-08 RX ADMIN — LIDOCAINE HYDROCHLORIDE 50 MG: 10 INJECTION, SOLUTION EPIDURAL; INFILTRATION; INTRACAUDAL; PERINEURAL at 11:06

## 2023-06-08 NOTE — PROGRESS NOTES
"Subjective:    Patient ID: Owen Montiel is a right handed 47 y.o. male  who presented to [facility] for new visit..    Chief Complaint: Pain of the Right Shoulder and Pain of the Left Shoulder    History of Present Illness:    Owen Montiel is a 47-year-old male who presents with complaints of bilateral shoulder pain.  He has been experiencing this pain for several years and notes that his left arm is worse than his right.  The pain is intermittent and made worse with overhead activities.  He was previously seen by a sports medicine physician on 03/29/2022 and received a left subacromial CSI if she says provided excellent relief for about 6 months.  Over the past few months, his pain has been getting worse.  He has not tried any therapy or taking any anti-inflammatories for this.    Shoulder Review of Systems:  Swelling?  No  Instability?  No  Clicking?  No  Limited ROM? No  Fever/Chills? No  Subluxation? No  Dislocation? No       Objective:      Physical Exam:    /76   Pulse 78   Ht 5' 6" (1.676 m)   Wt 76.2 kg (168 lb)   BMI 27.12 kg/m²       Appearance:  Soft tissue swelling: Left: no Right: no  Effusion: Left:  Negative Right: Negative  Erythema: Left no Right: no  Ecchymosis: Left: no Right: no  Atrophy: Left: no Right: no  Scapular winging: Left: no Right: no    Palpation:  Shoulder Tenderness: Left: none  Right: none    Range of motion:  Flexion (0-90): Left:  90 Right: 90  Abduction (0-180): Left:  180 Right: 180  External rotation (0-55): Left: 55 Right: 55  Internal rotation (0-45): Left: 45 Left: 45    Strength:  Abduction: Left: 5/5 Pain: Yes Right: 5/5 Pain: Yes  External rotation: Left: 5/5 Pain: No Right: 5/5 Pain: No  Internal rotation: Left: 5/5 Pain: No Right: 5/5 Pain: No  Elbow flexion: Left: 5/5 Pain: No Right: 5/5 Pain: No  Elbow extension: Left: 5/5 Pain: No Right: 5/5 Pain: No    Special Tests:  Subacromial Impingement  Neer: " Left: Negative Right: Negative  Simon: Left: Positive Right: Positive    AC Joint Arthritis:  Cross-body abduction: Left: Negative Right: Negative    Rotator Cuff Tear   Drop arm test: Left: Negative Right: Negative  Michela test (Empty can): Left: Negative Right: Negative    Biceps tendon/Labral tear  Speed's: Left: Negative Right: Negative  Yergason's: Left: Negative Right: Negative    Cervical   Spurling: Left: Negative Right: Negative    AIN/PIN/Radial nerve: Intact and symmetric    General appearance: NAD  Peripheral pulses: normal bilaterally   Sensation: normal    Labs:  Last A1c: 5.4     Imaging:   Previous images reviewed.  X-rays ordered and performed today: Yes  # of views: 4 Laterality: bilateral  My Interpretation:  There is narrowing of bilateral glenohumeral joints as well as minimal inferior glenoid osteophyte formation.  On scapular Y-view, the acromion does appear to encroach in the supraspinatus space.      Assessment:        Encounter Diagnoses   Code Name Primary?    M67.912 Tendinopathy of left rotator cuff Yes    M67.911 Tendinopathy of right rotator cuff     M25.819 Shoulder impingement     M25.811 Impingement of right shoulder     M19.011, M19.012 Primary osteoarthritis of both shoulders         Plan:           Orders Placed This Encounter   Procedures    X-ray Shoulder 2 or More Views Right     Standing Status:   Future     Number of Occurrences:   1     Standing Expiration Date:   6/8/2024     Order Specific Question:   May the Radiologist modify the order per protocol to meet the clinical needs of the patient?     Answer:   Yes     Order Specific Question:   Release to patient     Answer:   Immediate    X-Ray Shoulder 2 or More Views Left     Standing Status:   Future     Number of Occurrences:   1     Standing Expiration Date:   6/8/2024     Order Specific Question:   May the Radiologist modify the order per protocol to meet the clinical needs of the patient?     Answer:   Yes     Order  Specific Question:   Release to patient     Answer:   Immediate        Dx: bilateral rotator cuff tendinopathy due to impingement from the bilateral acromion.  Patient also with bilateral glenohumeral joint arthritis.    Treatment Plan: Discussed with patient diagnosis and treatment recommendations.   Natural history and expected course discussed. Questions answered.  Educational material distributed.  Reduction in offending activity.  Gentle ROM exercises.  Rest, ice, compression, and elevation (RICE) therapy.  Home physical therapy exercise handouts provided to patient.   formal PT script provided to patient. You may take this script to whichever physical therapist you would like to go to.   Over the counter NSAID and/or tylenol provided you do not have contraindications such as but not limited to liver or kidney disease or uncontrolled blood pressure. If you're doctors have told you to to not take them based on your health, do not take them.   Patient would also benefit from speaking to his PCP and a possible referral to a spine specialist for his neck pain as previous imaging from 01/07/2022 today showed degenerative changes.  Imaging: radiological studies ordered and independently reviewed; discussed with patient; pending radiologist interpretation.   Procedure: Discussed CSI as a treatment option.  Patient agreeable to bilateral subacromial CSI today.  Therapy: Physical Therapy  Medication: first line treatment with daily acetaminophen. Up to 1000 mg three times daily can be taken; medication precautions given. and start meloxicam 15 mg daily; medication precautions given. Please see your primary care physician for further refills.  RTC: PRN; call if any issues.         Large Joint Aspiration/Injection: bilateral subacromial bursa    Date/Time: 6/8/2023 9:30 AM  Performed by: Jonathon Pressley,   Authorized by: Jonathon Pressley, DO     Consent Done?:  Yes (Written)  Indications:  Diagnostic evaluation and pain  Site  marked: the procedure site was marked    Timeout: prior to procedure the correct patient, procedure, and site was verified    Prep: patient was prepped and draped in usual sterile fashion      Local anesthesia used?: Yes    Local anesthetic:  Topical anesthetic    Details:  Needle Size:  21 G  Location:  Shoulder  Laterality:  Bilateral  Site:  Bilateral subacromial bursa  Patient tolerance:  Patient tolerated the procedure well with no immediate complications     Staff: Luis Armando Concepcion MD     Risks:  Possible complications with the injection include bleeding, infection (.01%), tendon rupture, steroid flare, fat pad or soft tissue atrophy, skin depigmentation, allergic reaction to medications and vasovagal response. (steroid flare treatment is rest, ice, NSAIDs and resolves in 24-36 hours.)    Consent:  No absolute contraindications (cellulitis overlying joint, infection, lack of informed consent, allergy to injection medication, AVN protein or egg allergy for sodium hyaluronate, or history of steroid flare) or relative contraindications (uncontrolled DM2 A1c>10, coagulopathy, INR > 3.5, previous joint replacement or history of AVN).        Description:  The patient was prepped in normal sterile fashion use of chlorhexidine scrub and the appropriate and anatomic landmarks were identified with ultrasound.  Contents of syringe included: 5cc of 1% of lidocaine with 40mg of Kenalog     Post Procedure: Patient alert, and moving all extremities. ROM improved, pain decreased.  Good peripheral pulses, no signs of vascular compromise and range of motion intact.  Aftercare instructions were given to patient at time of discharge.  Relative rest for 3 days-avoiding excess activity.  Place ice on the area for 15 minutes every 4-6 hours. Patient may take Tylenol a 1000 mg b.i.d. or ibuprofen 600 mg t.i.d. for the next 3-4 days if not on medication already and safe to take pending co-morbidities.  Protect the area for the next  1-8 hours if anesthetic was used.  Avoid excessive activity for the next 3-4 weeks.  ER precautions given for fever, severe joint pain or allergic reaction or other new symptoms related to the joint injection.

## 2023-06-09 NOTE — PROGRESS NOTES
Faculty Attestation: Owen Montiel  was seen in [facility]. Discussed with Dr. Pressley at the time of the visit. History of Present Illness, Physical Exam, and Assessment and Plan reviewed. Treatment plan is reasonable and appropriate. Compliance with treatment recommendations is important.  Radiology images independently reviewed and agree with fellow interpretation.  Procedure note reviewed. Patient tolerated procedure well.      Luis Armando Concepcion MD

## 2023-07-14 ENCOUNTER — OFFICE VISIT (OUTPATIENT)
Dept: FAMILY MEDICINE | Facility: CLINIC | Age: 48
End: 2023-07-14
Payer: MEDICAID

## 2023-07-14 VITALS
DIASTOLIC BLOOD PRESSURE: 88 MMHG | HEART RATE: 76 BPM | BODY MASS INDEX: 26.87 KG/M2 | SYSTOLIC BLOOD PRESSURE: 125 MMHG | RESPIRATION RATE: 18 BRPM | OXYGEN SATURATION: 97 % | WEIGHT: 166.5 LBS

## 2023-07-14 DIAGNOSIS — Z87.19 HISTORY OF INGUINAL HERNIA REPAIR: ICD-10-CM

## 2023-07-14 DIAGNOSIS — N18.2 STAGE 2 CHRONIC KIDNEY DISEASE: Primary | ICD-10-CM

## 2023-07-14 DIAGNOSIS — M05.80 POLYARTHRITIS WITH POSITIVE RHEUMATOID FACTOR: ICD-10-CM

## 2023-07-14 DIAGNOSIS — Z98.890 HISTORY OF INGUINAL HERNIA REPAIR: ICD-10-CM

## 2023-07-14 LAB
ANION GAP SERPL CALC-SCNC: 8 MEQ/L
BUN SERPL-MCNC: 9.2 MG/DL (ref 8.9–20.6)
CALCIUM SERPL-MCNC: 9.8 MG/DL (ref 8.4–10.2)
CHLORIDE SERPL-SCNC: 108 MMOL/L (ref 98–107)
CO2 SERPL-SCNC: 25 MMOL/L (ref 22–29)
CREAT SERPL-MCNC: 1.25 MG/DL (ref 0.73–1.18)
CREAT UR-MCNC: 331.6 MG/DL (ref 63–166)
CREAT/UREA NIT SERPL: 7
GFR SERPLBLD CREATININE-BSD FMLA CKD-EPI: >60 MLS/MIN/1.73/M2
GLUCOSE SERPL-MCNC: 105 MG/DL (ref 74–100)
MICROALBUMIN UR-MCNC: 9.5 UG/ML
MICROALBUMIN/CREAT RATIO PNL UR: 2.9 MG/GM CR (ref 0–30)
POTASSIUM SERPL-SCNC: 4.4 MMOL/L (ref 3.5–5.1)
SODIUM SERPL-SCNC: 141 MMOL/L (ref 136–145)

## 2023-07-14 PROCEDURE — 3008F BODY MASS INDEX DOCD: CPT | Mod: CPTII,,, | Performed by: STUDENT IN AN ORGANIZED HEALTH CARE EDUCATION/TRAINING PROGRAM

## 2023-07-14 PROCEDURE — 99213 OFFICE O/P EST LOW 20 MIN: CPT | Mod: S$PBB,,, | Performed by: STUDENT IN AN ORGANIZED HEALTH CARE EDUCATION/TRAINING PROGRAM

## 2023-07-14 PROCEDURE — 3074F PR MOST RECENT SYSTOLIC BLOOD PRESSURE < 130 MM HG: ICD-10-PCS | Mod: CPTII,,, | Performed by: STUDENT IN AN ORGANIZED HEALTH CARE EDUCATION/TRAINING PROGRAM

## 2023-07-14 PROCEDURE — 3008F PR BODY MASS INDEX (BMI) DOCUMENTED: ICD-10-PCS | Mod: CPTII,,, | Performed by: STUDENT IN AN ORGANIZED HEALTH CARE EDUCATION/TRAINING PROGRAM

## 2023-07-14 PROCEDURE — 36415 COLL VENOUS BLD VENIPUNCTURE: CPT | Performed by: STUDENT IN AN ORGANIZED HEALTH CARE EDUCATION/TRAINING PROGRAM

## 2023-07-14 PROCEDURE — 99213 PR OFFICE/OUTPT VISIT, EST, LEVL III, 20-29 MIN: ICD-10-PCS | Mod: S$PBB,,, | Performed by: STUDENT IN AN ORGANIZED HEALTH CARE EDUCATION/TRAINING PROGRAM

## 2023-07-14 PROCEDURE — 3079F PR MOST RECENT DIASTOLIC BLOOD PRESSURE 80-89 MM HG: ICD-10-PCS | Mod: CPTII,,, | Performed by: STUDENT IN AN ORGANIZED HEALTH CARE EDUCATION/TRAINING PROGRAM

## 2023-07-14 PROCEDURE — 3074F SYST BP LT 130 MM HG: CPT | Mod: CPTII,,, | Performed by: STUDENT IN AN ORGANIZED HEALTH CARE EDUCATION/TRAINING PROGRAM

## 2023-07-14 PROCEDURE — 3079F DIAST BP 80-89 MM HG: CPT | Mod: CPTII,,, | Performed by: STUDENT IN AN ORGANIZED HEALTH CARE EDUCATION/TRAINING PROGRAM

## 2023-07-14 PROCEDURE — 80048 BASIC METABOLIC PNL TOTAL CA: CPT | Performed by: STUDENT IN AN ORGANIZED HEALTH CARE EDUCATION/TRAINING PROGRAM

## 2023-07-14 PROCEDURE — 82043 UR ALBUMIN QUANTITATIVE: CPT | Performed by: STUDENT IN AN ORGANIZED HEALTH CARE EDUCATION/TRAINING PROGRAM

## 2023-07-14 PROCEDURE — 99213 OFFICE O/P EST LOW 20 MIN: CPT | Mod: PBBFAC,PN | Performed by: STUDENT IN AN ORGANIZED HEALTH CARE EDUCATION/TRAINING PROGRAM
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
## 2023-07-18 ENCOUNTER — PATIENT MESSAGE (OUTPATIENT)
Dept: FAMILY MEDICINE | Facility: CLINIC | Age: 48
End: 2023-07-18
Payer: MEDICAID

## 2023-11-13 ENCOUNTER — OFFICE VISIT (OUTPATIENT)
Dept: OPHTHALMOLOGY | Facility: CLINIC | Age: 48
End: 2023-11-13
Payer: MEDICAID

## 2023-11-13 VITALS — HEIGHT: 66 IN | BODY MASS INDEX: 26.52 KG/M2 | WEIGHT: 165 LBS

## 2023-11-13 DIAGNOSIS — H04.123 DRY EYES, BILATERAL: ICD-10-CM

## 2023-11-13 DIAGNOSIS — H52.203 MYOPIA WITH ASTIGMATISM AND PRESBYOPIA, BILATERAL: Primary | ICD-10-CM

## 2023-11-13 DIAGNOSIS — H52.13 MYOPIA WITH ASTIGMATISM AND PRESBYOPIA, BILATERAL: Primary | ICD-10-CM

## 2023-11-13 DIAGNOSIS — H52.4 MYOPIA WITH ASTIGMATISM AND PRESBYOPIA, BILATERAL: Primary | ICD-10-CM

## 2023-11-13 PROCEDURE — 99212 OFFICE O/P EST SF 10 MIN: CPT | Mod: PBBFAC,PN

## 2023-11-13 NOTE — PROGRESS NOTES
HPI     Blurred Vision     Additional comments: Pt states he has blurry vision every now and then           Watery eyes     Additional comments: More so in his left eye states when he laying down   that's when it leaks the most           Comments    Myopia with astigmatism and presbyopia, bilateral           Last edited by Cha Ahumada MA on 11/13/2023  1:36 PM.            Assessment /Plan     For exam results, see Encounter Report.    There are no diagnoses linked to this encounter.  HPI    RTC 3 mos DFE  Last edited by Danyelle Mann MA on 11/15/2022  2:39 PM.        Assessment /Plan     For exam results, see Encounter Report.    Myopia with astigmatism and presbyopia, bilateral    Dry eyes, bilateral      1. Myopia with astigmatism  - MRx provided today 11/13/2023    2. NSC  - NVS, CTM     3. Dry eye syndrome  - WC and ATs QID    RTC 1 year annual exam

## 2024-02-20 ENCOUNTER — OFFICE VISIT (OUTPATIENT)
Dept: FAMILY MEDICINE | Facility: CLINIC | Age: 49
End: 2024-02-20
Payer: MEDICAID

## 2024-02-20 VITALS
WEIGHT: 173 LBS | SYSTOLIC BLOOD PRESSURE: 125 MMHG | BODY MASS INDEX: 27.8 KG/M2 | TEMPERATURE: 98 F | OXYGEN SATURATION: 99 % | DIASTOLIC BLOOD PRESSURE: 84 MMHG | HEIGHT: 66 IN | HEART RATE: 68 BPM

## 2024-02-20 DIAGNOSIS — R79.89 ELEVATED SERUM CREATININE: ICD-10-CM

## 2024-02-20 DIAGNOSIS — J06.9 VIRAL UPPER RESPIRATORY TRACT INFECTION: ICD-10-CM

## 2024-02-20 DIAGNOSIS — R09.82 POST-NASAL DRIP: ICD-10-CM

## 2024-02-20 DIAGNOSIS — Z00.00 WELLNESS EXAMINATION: ICD-10-CM

## 2024-02-20 DIAGNOSIS — J02.9 SORE THROAT: ICD-10-CM

## 2024-02-20 DIAGNOSIS — M05.80 POLYARTHRITIS WITH POSITIVE RHEUMATOID FACTOR: Primary | ICD-10-CM

## 2024-02-20 DIAGNOSIS — N18.2 STAGE 2 CHRONIC KIDNEY DISEASE: ICD-10-CM

## 2024-02-20 LAB
ALBUMIN SERPL-MCNC: 4.3 G/DL (ref 3.5–5)
ALBUMIN/GLOB SERPL: 1.3 RATIO (ref 1.1–2)
ALP SERPL-CCNC: 75 UNIT/L (ref 40–150)
ALT SERPL-CCNC: 19 UNIT/L (ref 0–55)
AST SERPL-CCNC: 18 UNIT/L (ref 5–34)
BASOPHILS # BLD AUTO: 0.05 X10(3)/MCL
BASOPHILS NFR BLD AUTO: 0.7 %
BILIRUB SERPL-MCNC: 0.3 MG/DL
BUN SERPL-MCNC: 9.7 MG/DL (ref 8.9–20.6)
CALCIUM SERPL-MCNC: 9.9 MG/DL (ref 8.4–10.2)
CHLORIDE SERPL-SCNC: 108 MMOL/L (ref 98–107)
CHOLEST SERPL-MCNC: 215 MG/DL
CHOLEST/HDLC SERPL: 6 {RATIO} (ref 0–5)
CO2 SERPL-SCNC: 26 MMOL/L (ref 22–29)
CREAT SERPL-MCNC: 1.31 MG/DL (ref 0.73–1.18)
CTP QC/QA: YES
DEPRECATED CALCIDIOL+CALCIFEROL SERPL-MC: 30 NG/ML (ref 30–80)
EOSINOPHIL # BLD AUTO: 0.09 X10(3)/MCL (ref 0–0.9)
EOSINOPHIL NFR BLD AUTO: 1.2 %
ERYTHROCYTE [DISTWIDTH] IN BLOOD BY AUTOMATED COUNT: 13.1 % (ref 11.5–17)
EST. AVERAGE GLUCOSE BLD GHB EST-MCNC: 108.3 MG/DL
GFR SERPLBLD CREATININE-BSD FMLA CKD-EPI: >60 MLS/MIN/1.73/M2
GLOBULIN SER-MCNC: 3.3 GM/DL (ref 2.4–3.5)
GLUCOSE SERPL-MCNC: 95 MG/DL (ref 74–100)
HBA1C MFR BLD: 5.4 %
HCT VFR BLD AUTO: 44.1 % (ref 42–52)
HDLC SERPL-MCNC: 39 MG/DL (ref 35–60)
HGB BLD-MCNC: 14.5 G/DL (ref 14–18)
IMM GRANULOCYTES # BLD AUTO: 0.02 X10(3)/MCL (ref 0–0.04)
IMM GRANULOCYTES NFR BLD AUTO: 0.3 %
LDLC SERPL CALC-MCNC: 153 MG/DL (ref 50–140)
LYMPHOCYTES # BLD AUTO: 2.26 X10(3)/MCL (ref 0.6–4.6)
LYMPHOCYTES NFR BLD AUTO: 30.6 %
MCH RBC QN AUTO: 29.8 PG (ref 27–31)
MCHC RBC AUTO-ENTMCNC: 32.9 G/DL (ref 33–36)
MCV RBC AUTO: 90.7 FL (ref 80–94)
MOLECULAR STREP A: NEGATIVE
MONOCYTES # BLD AUTO: 0.58 X10(3)/MCL (ref 0.1–1.3)
MONOCYTES NFR BLD AUTO: 7.9 %
NEUTROPHILS # BLD AUTO: 4.38 X10(3)/MCL (ref 2.1–9.2)
NEUTROPHILS NFR BLD AUTO: 59.3 %
NRBC BLD AUTO-RTO: 0 %
PLATELET # BLD AUTO: 203 X10(3)/MCL (ref 130–400)
PMV BLD AUTO: 11.9 FL (ref 7.4–10.4)
POC MOLECULAR INFLUENZA A AGN: NEGATIVE
POC MOLECULAR INFLUENZA B AGN: NEGATIVE
POTASSIUM SERPL-SCNC: 4.4 MMOL/L (ref 3.5–5.1)
PROT SERPL-MCNC: 7.6 GM/DL (ref 6.4–8.3)
PSA SERPL-MCNC: 0.45 NG/ML
RBC # BLD AUTO: 4.86 X10(6)/MCL (ref 4.7–6.1)
SARS-COV-2 RDRP RESP QL NAA+PROBE: NEGATIVE
SODIUM SERPL-SCNC: 141 MMOL/L (ref 136–145)
T4 FREE SERPL-MCNC: 0.97 NG/DL (ref 0.7–1.48)
TRIGL SERPL-MCNC: 113 MG/DL (ref 34–140)
TSH SERPL-ACNC: 1.07 UIU/ML (ref 0.35–4.94)
VLDLC SERPL CALC-MCNC: 23 MG/DL
WBC # SPEC AUTO: 7.38 X10(3)/MCL (ref 4.5–11.5)

## 2024-02-20 PROCEDURE — 83036 HEMOGLOBIN GLYCOSYLATED A1C: CPT | Performed by: STUDENT IN AN ORGANIZED HEALTH CARE EDUCATION/TRAINING PROGRAM

## 2024-02-20 PROCEDURE — 36415 COLL VENOUS BLD VENIPUNCTURE: CPT | Performed by: STUDENT IN AN ORGANIZED HEALTH CARE EDUCATION/TRAINING PROGRAM

## 2024-02-20 PROCEDURE — 3008F BODY MASS INDEX DOCD: CPT | Mod: CPTII,,, | Performed by: STUDENT IN AN ORGANIZED HEALTH CARE EDUCATION/TRAINING PROGRAM

## 2024-02-20 PROCEDURE — 87635 SARS-COV-2 COVID-19 AMP PRB: CPT | Mod: PBBFAC,PN | Performed by: STUDENT IN AN ORGANIZED HEALTH CARE EDUCATION/TRAINING PROGRAM

## 2024-02-20 PROCEDURE — 3079F DIAST BP 80-89 MM HG: CPT | Mod: CPTII,,, | Performed by: STUDENT IN AN ORGANIZED HEALTH CARE EDUCATION/TRAINING PROGRAM

## 2024-02-20 PROCEDURE — 87502 INFLUENZA DNA AMP PROBE: CPT | Mod: PBBFAC,PN | Performed by: STUDENT IN AN ORGANIZED HEALTH CARE EDUCATION/TRAINING PROGRAM

## 2024-02-20 PROCEDURE — 87651 STREP A DNA AMP PROBE: CPT | Mod: PBBFAC,PN | Performed by: STUDENT IN AN ORGANIZED HEALTH CARE EDUCATION/TRAINING PROGRAM

## 2024-02-20 PROCEDURE — 3044F HG A1C LEVEL LT 7.0%: CPT | Mod: CPTII,,, | Performed by: STUDENT IN AN ORGANIZED HEALTH CARE EDUCATION/TRAINING PROGRAM

## 2024-02-20 PROCEDURE — 82306 VITAMIN D 25 HYDROXY: CPT | Performed by: STUDENT IN AN ORGANIZED HEALTH CARE EDUCATION/TRAINING PROGRAM

## 2024-02-20 PROCEDURE — 80061 LIPID PANEL: CPT | Performed by: STUDENT IN AN ORGANIZED HEALTH CARE EDUCATION/TRAINING PROGRAM

## 2024-02-20 PROCEDURE — 84443 ASSAY THYROID STIM HORMONE: CPT | Performed by: STUDENT IN AN ORGANIZED HEALTH CARE EDUCATION/TRAINING PROGRAM

## 2024-02-20 PROCEDURE — 80053 COMPREHEN METABOLIC PANEL: CPT | Performed by: STUDENT IN AN ORGANIZED HEALTH CARE EDUCATION/TRAINING PROGRAM

## 2024-02-20 PROCEDURE — 3074F SYST BP LT 130 MM HG: CPT | Mod: CPTII,,, | Performed by: STUDENT IN AN ORGANIZED HEALTH CARE EDUCATION/TRAINING PROGRAM

## 2024-02-20 PROCEDURE — 99214 OFFICE O/P EST MOD 30 MIN: CPT | Mod: S$PBB,,, | Performed by: STUDENT IN AN ORGANIZED HEALTH CARE EDUCATION/TRAINING PROGRAM

## 2024-02-20 PROCEDURE — 85025 COMPLETE CBC W/AUTO DIFF WBC: CPT | Performed by: STUDENT IN AN ORGANIZED HEALTH CARE EDUCATION/TRAINING PROGRAM

## 2024-02-20 PROCEDURE — 84439 ASSAY OF FREE THYROXINE: CPT | Performed by: STUDENT IN AN ORGANIZED HEALTH CARE EDUCATION/TRAINING PROGRAM

## 2024-02-20 PROCEDURE — 84153 ASSAY OF PSA TOTAL: CPT | Performed by: STUDENT IN AN ORGANIZED HEALTH CARE EDUCATION/TRAINING PROGRAM

## 2024-02-20 PROCEDURE — 99213 OFFICE O/P EST LOW 20 MIN: CPT | Mod: PBBFAC,PN | Performed by: STUDENT IN AN ORGANIZED HEALTH CARE EDUCATION/TRAINING PROGRAM

## 2024-02-20 RX ORDER — FLUTICASONE PROPIONATE 50 MCG
1 SPRAY, SUSPENSION (ML) NASAL DAILY
Qty: 18.2 ML | Refills: 11 | Status: SHIPPED | OUTPATIENT
Start: 2024-02-20 | End: 2024-05-20

## 2024-02-20 RX ORDER — CETIRIZINE HYDROCHLORIDE 10 MG/1
10 TABLET ORAL DAILY
Qty: 30 TABLET | Refills: 11 | Status: SHIPPED | OUTPATIENT
Start: 2024-02-20 | End: 2024-05-20
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
## 2024-05-20 ENCOUNTER — OFFICE VISIT (OUTPATIENT)
Dept: FAMILY MEDICINE | Facility: CLINIC | Age: 49
End: 2024-05-20
Payer: MEDICAID

## 2024-05-20 VITALS
HEIGHT: 66 IN | WEIGHT: 175 LBS | BODY MASS INDEX: 28.12 KG/M2 | TEMPERATURE: 98 F | DIASTOLIC BLOOD PRESSURE: 79 MMHG | HEART RATE: 87 BPM | OXYGEN SATURATION: 97 % | SYSTOLIC BLOOD PRESSURE: 121 MMHG

## 2024-05-20 DIAGNOSIS — F12.90 MARIJUANA USE: ICD-10-CM

## 2024-05-20 DIAGNOSIS — R53.83 FATIGUE, UNSPECIFIED TYPE: ICD-10-CM

## 2024-05-20 DIAGNOSIS — F41.9 ANXIETY: ICD-10-CM

## 2024-05-20 DIAGNOSIS — N18.2 STAGE 2 CHRONIC KIDNEY DISEASE: ICD-10-CM

## 2024-05-20 DIAGNOSIS — Z86.39 HISTORY OF THYROID NODULE: Primary | ICD-10-CM

## 2024-05-20 DIAGNOSIS — M05.80 POLYARTHRITIS WITH POSITIVE RHEUMATOID FACTOR: ICD-10-CM

## 2024-05-20 LAB
ALBUMIN SERPL-MCNC: 4.1 G/DL (ref 3.5–5)
ALBUMIN/GLOB SERPL: 1.5 RATIO (ref 1.1–2)
ALP SERPL-CCNC: 60 UNIT/L (ref 40–150)
ALT SERPL-CCNC: 16 UNIT/L (ref 0–55)
ANION GAP SERPL CALC-SCNC: 7 MEQ/L
AST SERPL-CCNC: 15 UNIT/L (ref 5–34)
BASOPHILS # BLD AUTO: 0.06 X10(3)/MCL
BASOPHILS NFR BLD AUTO: 0.8 %
BILIRUB SERPL-MCNC: 0.3 MG/DL
BUN SERPL-MCNC: 13.3 MG/DL (ref 8.9–20.6)
CALCIUM SERPL-MCNC: 9.4 MG/DL (ref 8.4–10.2)
CHLORIDE SERPL-SCNC: 109 MMOL/L (ref 98–107)
CO2 SERPL-SCNC: 21 MMOL/L (ref 22–29)
CREAT SERPL-MCNC: 1.25 MG/DL (ref 0.73–1.18)
CREAT/UREA NIT SERPL: 11
EOSINOPHIL # BLD AUTO: 0.11 X10(3)/MCL (ref 0–0.9)
EOSINOPHIL NFR BLD AUTO: 1.5 %
ERYTHROCYTE [DISTWIDTH] IN BLOOD BY AUTOMATED COUNT: 13.2 % (ref 11.5–17)
GFR SERPLBLD CREATININE-BSD FMLA CKD-EPI: >60 ML/MIN/1.73/M2
GLOBULIN SER-MCNC: 2.7 GM/DL (ref 2.4–3.5)
GLUCOSE SERPL-MCNC: 83 MG/DL (ref 74–100)
HCT VFR BLD AUTO: 42.8 % (ref 42–52)
HGB BLD-MCNC: 14.5 G/DL (ref 14–18)
IMM GRANULOCYTES # BLD AUTO: 0.01 X10(3)/MCL (ref 0–0.04)
IMM GRANULOCYTES NFR BLD AUTO: 0.1 %
LYMPHOCYTES # BLD AUTO: 2.63 X10(3)/MCL (ref 0.6–4.6)
LYMPHOCYTES NFR BLD AUTO: 36.6 %
MCH RBC QN AUTO: 30.3 PG (ref 27–31)
MCHC RBC AUTO-ENTMCNC: 33.9 G/DL (ref 33–36)
MCV RBC AUTO: 89.4 FL (ref 80–94)
MONOCYTES # BLD AUTO: 0.68 X10(3)/MCL (ref 0.1–1.3)
MONOCYTES NFR BLD AUTO: 9.5 %
NEUTROPHILS # BLD AUTO: 3.7 X10(3)/MCL (ref 2.1–9.2)
NEUTROPHILS NFR BLD AUTO: 51.5 %
NRBC BLD AUTO-RTO: 0 %
PLATELET # BLD AUTO: 165 X10(3)/MCL (ref 130–400)
PMV BLD AUTO: 11.8 FL (ref 7.4–10.4)
POTASSIUM SERPL-SCNC: 5.1 MMOL/L (ref 3.5–5.1)
PROT SERPL-MCNC: 6.8 GM/DL (ref 6.4–8.3)
RBC # BLD AUTO: 4.79 X10(6)/MCL (ref 4.7–6.1)
SODIUM SERPL-SCNC: 137 MMOL/L (ref 136–145)
WBC # SPEC AUTO: 7.19 X10(3)/MCL (ref 4.5–11.5)

## 2024-05-20 PROCEDURE — 3008F BODY MASS INDEX DOCD: CPT | Mod: CPTII,,, | Performed by: STUDENT IN AN ORGANIZED HEALTH CARE EDUCATION/TRAINING PROGRAM

## 2024-05-20 PROCEDURE — 99214 OFFICE O/P EST MOD 30 MIN: CPT | Mod: S$PBB,,, | Performed by: STUDENT IN AN ORGANIZED HEALTH CARE EDUCATION/TRAINING PROGRAM

## 2024-05-20 PROCEDURE — 99214 OFFICE O/P EST MOD 30 MIN: CPT | Mod: PBBFAC,PN | Performed by: STUDENT IN AN ORGANIZED HEALTH CARE EDUCATION/TRAINING PROGRAM

## 2024-05-20 PROCEDURE — 36415 COLL VENOUS BLD VENIPUNCTURE: CPT | Performed by: STUDENT IN AN ORGANIZED HEALTH CARE EDUCATION/TRAINING PROGRAM

## 2024-05-20 PROCEDURE — 3044F HG A1C LEVEL LT 7.0%: CPT | Mod: CPTII,,, | Performed by: STUDENT IN AN ORGANIZED HEALTH CARE EDUCATION/TRAINING PROGRAM

## 2024-05-20 PROCEDURE — 1159F MED LIST DOCD IN RCRD: CPT | Mod: CPTII,,, | Performed by: STUDENT IN AN ORGANIZED HEALTH CARE EDUCATION/TRAINING PROGRAM

## 2024-05-20 PROCEDURE — 80053 COMPREHEN METABOLIC PANEL: CPT | Performed by: STUDENT IN AN ORGANIZED HEALTH CARE EDUCATION/TRAINING PROGRAM

## 2024-05-20 PROCEDURE — 85025 COMPLETE CBC W/AUTO DIFF WBC: CPT | Performed by: STUDENT IN AN ORGANIZED HEALTH CARE EDUCATION/TRAINING PROGRAM

## 2024-05-20 PROCEDURE — 3078F DIAST BP <80 MM HG: CPT | Mod: CPTII,,, | Performed by: STUDENT IN AN ORGANIZED HEALTH CARE EDUCATION/TRAINING PROGRAM

## 2024-05-20 PROCEDURE — 3074F SYST BP LT 130 MM HG: CPT | Mod: CPTII,,, | Performed by: STUDENT IN AN ORGANIZED HEALTH CARE EDUCATION/TRAINING PROGRAM

## 2024-05-20 RX ORDER — HYDROXYZINE HYDROCHLORIDE 25 MG/1
25 TABLET, FILM COATED ORAL EVERY 8 HOURS PRN
Qty: 90 TABLET | Refills: 2 | Status: SHIPPED | OUTPATIENT
Start: 2024-05-20

## 2024-05-20 RX ORDER — TRAMADOL HYDROCHLORIDE 50 MG/1
50 TABLET ORAL EVERY 8 HOURS PRN
Qty: 21 TABLET | Refills: 0 | Status: SHIPPED | OUTPATIENT
Start: 2024-05-20

## 2024-05-20 NOTE — ASSESSMENT & PLAN NOTE
Discussed limiting salt   No NSAID use no alcohol use  CMP today advised patient to remain hydrated

## 2024-05-20 NOTE — ASSESSMENT & PLAN NOTE
Spent 4 minutes discussing marijuana cessation with patient as patient is having side effects of extreme coughing after he smokes  Patient reports that he has a lot of anxiety when he decides to smoke marijuana  As this is away of calming it offered patient counseling, medication  Declines daily medication states he will take a p.r.n. see above

## 2024-10-02 NOTE — PROGRESS NOTES
"[facility]  Otolaryngology Clinic Note    Owen Montiel  YOB: 1975    Chief Complaint: "history of thyroid nodule"       HPI:  49 y.o. male referred for "history of thyroid nodule." Pt reports being unsure why he is here, that he told PCP he was having night sweats and was told he should have thyroid checked. Reports having labs drawn with PCP & being told they were normal. States he has had a raspy voice all his life. He was told as a child that he had nodules on his vocal cords. Has never been to speech therapy. Denies worsening of voice quality. Does not yell or sing but states he talks a lot. Does not have bothersome reflux but reports his voice is very hoarse sometimes when he first wakes up. Denies dysphagia, SOB, or family hx of thyroid cancer. Denies fever, chills, or wt loss. Smoker of 1/2 ppd. Sometimes feels left jaw/neck is sore and feels a little swollen.     ROS:   10-point review of systems negative except per HPI      Review of patient's allergies indicates:  No Known Allergies    Past Medical History:   Diagnosis Date    Arthritis        Past Surgical History:   Procedure Laterality Date    FRACTURE SURGERY  02/09/2021    Ankle surgery    HERNIA REPAIR  2001       Social History     Socioeconomic History    Marital status: Single   Tobacco Use    Smoking status: Every Day     Current packs/day: 0.50     Average packs/day: 0.5 packs/day for 24.0 years (12.0 ttl pk-yrs)     Types: Cigarettes     Start date: 9/8/2000     Passive exposure: Never    Smokeless tobacco: Never   Substance and Sexual Activity    Alcohol use: Never    Drug use: Yes     Frequency: 5.0 times per week     Types: Marijuana     Comment: everyday    Sexual activity: Yes     Partners: Female     Social Determinants of Health     Financial Resource Strain: Low Risk  (9/26/2022)    Overall Financial Resource Strain (CARDIA)     Difficulty of Paying Living Expenses: Not hard at all   Food " Insecurity: No Food Insecurity (9/26/2022)    Hunger Vital Sign     Worried About Running Out of Food in the Last Year: Never true     Ran Out of Food in the Last Year: Never true   Transportation Needs: No Transportation Needs (9/26/2022)    PRAPARE - Transportation     Lack of Transportation (Medical): No     Lack of Transportation (Non-Medical): No   Stress: No Stress Concern Present (9/26/2022)    [employer] - Occupational Stress Questionnaire     Feeling of Stress : Not at all   Housing Stability: Unknown (9/26/2022)    Housing Stability Vital Sign     Unable to Pay for Housing in the Last Year: No     Unstable Housing in the Last Year: No       Family History   Problem Relation Name Age of Onset    Kidney disease Mother      Heart disease Father      Arthritis Sister Danyelle Montiel     Cancer Sister Danyelle Montiel        Outpatient Encounter Medications as of 9/27/2024   Medication Sig Dispense Refill    hydrOXYzine HCL (ATARAX) 25 MG tablet Take 1 tablet (25 mg total) by mouth every 8 (eight) hours as needed for Anxiety. 90 tablet 2    traMADoL (ULTRAM) 50 mg tablet Take 1 tablet (50 mg total) by mouth every 8 (eight) hours as needed for Pain. 21 tablet 0     No facility-administered encounter medications on file as of 9/27/2024.       Physical Exam:  There were no vitals filed for this visit.    Physical Exam   General: NAD, voice raspy  Neuro: AAO, CN II - XII grossly intact  Head/ Face: NCAT, symmetric, sensations intact bilaterally. Mild left TMJ TTP  Eyes: EOMI, PERRL  Ears: externally normal with grossly normal hearing  AD: EAC patent, TM intact, no middle ear effusion, no retractions  AS: EAC patent, TM intact, no middle ear effusion, no retractions  Nose: bilateral nares patent, midline septum, no rhinorrhea, no external deformity, no turbinate hypertrophy  OC/OP: MMM, no intraoral lesions, no trismus, dentition is moderate, no uvular deviation, bilaterally symmetric soft  palate elevation, palatoglossus and palatopharyngeal fold wnl; tonsils are symmetric and 1+  Indirect laryngoscopy: deferred due to patient intolerance  Neck: tight musculature, supple, no LAD, normal ROM, no thyromegaly or palpable nodularity  Respiratory: nonlabored, no wheezing, bilateral chest rise  Cardiovascular: RRR  Gastrointestinal: S NT ND  Skin: warm, no lesions  Musculoskeletal: 5/5 strength  Psych: Appropriate affect/mood     Pertinent Data:  ? LABS:    ? AUDIO:           ? PATH:      Imaging:   I personally reviewed the following images:        Assessment/Plan:  49 y.o. male with subjective hx of vocal cord nodules, stable raspy voice since childhood. Recommended FFL but pt refused, stating he remembers having it done as a child and is not interested in repeating it. Declined speech referral.  - GERD lifestyle modifications  - Protonix daily  - Encouraged tobacco cessation  - Encouraged trial of home MTD exercises  - TMJ/neck exercises  - RTC 3-4mo on Res day    Maida Garcia NP

## 2024-10-04 ENCOUNTER — PATIENT MESSAGE (OUTPATIENT)
Dept: OTOLARYNGOLOGY | Facility: CLINIC | Age: 49
End: 2024-10-04

## 2024-10-04 ENCOUNTER — OFFICE VISIT (OUTPATIENT)
Dept: OTOLARYNGOLOGY | Facility: CLINIC | Age: 49
End: 2024-10-04
Payer: MEDICAID

## 2024-10-04 VITALS
SYSTOLIC BLOOD PRESSURE: 113 MMHG | TEMPERATURE: 99 F | HEIGHT: 66 IN | WEIGHT: 175.06 LBS | RESPIRATION RATE: 20 BRPM | HEART RATE: 78 BPM | DIASTOLIC BLOOD PRESSURE: 64 MMHG | OXYGEN SATURATION: 99 % | BODY MASS INDEX: 28.14 KG/M2

## 2024-10-04 DIAGNOSIS — K21.9 LPRD (LARYNGOPHARYNGEAL REFLUX DISEASE): ICD-10-CM

## 2024-10-04 DIAGNOSIS — R49.0 DYSPHONIA: Primary | ICD-10-CM

## 2024-10-04 DIAGNOSIS — M79.18 MYOFASCIAL PAIN: ICD-10-CM

## 2024-10-04 DIAGNOSIS — Z86.39 HISTORY OF THYROID NODULE: ICD-10-CM

## 2024-10-04 DIAGNOSIS — Z72.0 TOBACCO USE: ICD-10-CM

## 2024-10-04 PROCEDURE — 99213 OFFICE O/P EST LOW 20 MIN: CPT | Mod: PBBFAC | Performed by: NURSE PRACTITIONER

## 2024-10-04 RX ORDER — PANTOPRAZOLE SODIUM 40 MG/1
40 TABLET, DELAYED RELEASE ORAL DAILY
Qty: 90 TABLET | Refills: 0 | Status: SHIPPED | OUTPATIENT
Start: 2024-10-04 | End: 2025-10-04

## 2024-11-20 ENCOUNTER — HOSPITAL ENCOUNTER (OUTPATIENT)
Dept: RADIOLOGY | Facility: HOSPITAL | Age: 49
Discharge: HOME | End: 2024-11-20
Attending: STUDENT IN AN ORGANIZED HEALTH CARE EDUCATION/TRAINING PROGRAM
Payer: MEDICAID

## 2024-11-20 ENCOUNTER — OFFICE VISIT (OUTPATIENT)
Dept: FAMILY MEDICINE | Facility: CLINIC | Age: 49
End: 2024-11-20
Payer: MEDICAID

## 2024-11-20 VITALS
BODY MASS INDEX: 28.61 KG/M2 | SYSTOLIC BLOOD PRESSURE: 119 MMHG | OXYGEN SATURATION: 99 % | WEIGHT: 178 LBS | HEIGHT: 66 IN | DIASTOLIC BLOOD PRESSURE: 82 MMHG | TEMPERATURE: 99 F | HEART RATE: 80 BPM

## 2024-11-20 DIAGNOSIS — R61 NIGHT SWEATS: ICD-10-CM

## 2024-11-20 DIAGNOSIS — L97.511 SKIN ULCER OF TOE OF RIGHT FOOT, LIMITED TO BREAKDOWN OF SKIN: ICD-10-CM

## 2024-11-20 DIAGNOSIS — R61 NIGHT SWEATS: Primary | ICD-10-CM

## 2024-11-20 DIAGNOSIS — M54.50 CHRONIC BILATERAL LOW BACK PAIN WITHOUT SCIATICA: ICD-10-CM

## 2024-11-20 DIAGNOSIS — G89.29 CHRONIC BILATERAL LOW BACK PAIN WITHOUT SCIATICA: ICD-10-CM

## 2024-11-20 DIAGNOSIS — R61 HYPERHIDROSIS: ICD-10-CM

## 2024-11-20 DIAGNOSIS — N18.2 STAGE 2 CHRONIC KIDNEY DISEASE: ICD-10-CM

## 2024-11-20 LAB
ALBUMIN SERPL-MCNC: 4.4 G/DL (ref 3.5–5)
ALBUMIN/GLOB SERPL: 1.6 RATIO (ref 1.1–2)
ALP SERPL-CCNC: 69 UNIT/L (ref 40–150)
ALT SERPL-CCNC: 14 UNIT/L (ref 0–55)
ANION GAP SERPL CALC-SCNC: 5 MEQ/L
AST SERPL-CCNC: 14 UNIT/L (ref 5–34)
BASOPHILS # BLD AUTO: 0.06 X10(3)/MCL
BASOPHILS NFR BLD AUTO: 1.1 %
BILIRUB SERPL-MCNC: 0.4 MG/DL
BUN SERPL-MCNC: 9.5 MG/DL (ref 8.9–20.6)
CALCIUM SERPL-MCNC: 10.5 MG/DL (ref 8.4–10.2)
CHLORIDE SERPL-SCNC: 107 MMOL/L (ref 98–107)
CO2 SERPL-SCNC: 26 MMOL/L (ref 22–29)
CREAT SERPL-MCNC: 1.39 MG/DL (ref 0.72–1.25)
CREAT/UREA NIT SERPL: 7
EOSINOPHIL # BLD AUTO: 0.08 X10(3)/MCL (ref 0–0.9)
EOSINOPHIL NFR BLD AUTO: 1.4 %
ERYTHROCYTE [DISTWIDTH] IN BLOOD BY AUTOMATED COUNT: 12.4 % (ref 11.5–17)
GFR SERPLBLD CREATININE-BSD FMLA CKD-EPI: >60 ML/MIN/1.73/M2
GLOBULIN SER-MCNC: 2.7 GM/DL (ref 2.4–3.5)
GLUCOSE SERPL-MCNC: 99 MG/DL (ref 74–100)
HCT VFR BLD AUTO: 45.2 % (ref 42–52)
HGB BLD-MCNC: 15.3 G/DL (ref 14–18)
IMM GRANULOCYTES # BLD AUTO: 0.01 X10(3)/MCL (ref 0–0.04)
IMM GRANULOCYTES NFR BLD AUTO: 0.2 %
LYMPHOCYTES # BLD AUTO: 2.01 X10(3)/MCL (ref 0.6–4.6)
LYMPHOCYTES NFR BLD AUTO: 35.4 %
MCH RBC QN AUTO: 30.5 PG (ref 27–31)
MCHC RBC AUTO-ENTMCNC: 33.8 G/DL (ref 33–36)
MCV RBC AUTO: 90 FL (ref 80–94)
MONOCYTES # BLD AUTO: 0.46 X10(3)/MCL (ref 0.1–1.3)
MONOCYTES NFR BLD AUTO: 8.1 %
NEUTROPHILS # BLD AUTO: 3.05 X10(3)/MCL (ref 2.1–9.2)
NEUTROPHILS NFR BLD AUTO: 53.8 %
NRBC BLD AUTO-RTO: 0 %
PLATELET # BLD AUTO: 163 X10(3)/MCL (ref 130–400)
PMV BLD AUTO: 11.2 FL (ref 7.4–10.4)
POTASSIUM SERPL-SCNC: 4.4 MMOL/L (ref 3.5–5.1)
PROT SERPL-MCNC: 7.1 GM/DL (ref 6.4–8.3)
RBC # BLD AUTO: 5.02 X10(6)/MCL (ref 4.7–6.1)
SODIUM SERPL-SCNC: 138 MMOL/L (ref 136–145)
T4 FREE SERPL-MCNC: 0.95 NG/DL (ref 0.7–1.48)
TESTOST SERPL-MCNC: 883.03 NG/DL (ref 240.24–870.68)
TSH SERPL-ACNC: 0.58 UIU/ML (ref 0.35–4.94)
WBC # BLD AUTO: 5.67 X10(3)/MCL (ref 4.5–11.5)

## 2024-11-20 PROCEDURE — 72100 X-RAY EXAM L-S SPINE 2/3 VWS: CPT | Mod: TC,PN

## 2024-11-20 PROCEDURE — 36415 COLL VENOUS BLD VENIPUNCTURE: CPT | Performed by: STUDENT IN AN ORGANIZED HEALTH CARE EDUCATION/TRAINING PROGRAM

## 2024-11-20 PROCEDURE — 99214 OFFICE O/P EST MOD 30 MIN: CPT | Mod: PBBFAC,25,PN | Performed by: STUDENT IN AN ORGANIZED HEALTH CARE EDUCATION/TRAINING PROGRAM

## 2024-11-20 PROCEDURE — 85025 COMPLETE CBC W/AUTO DIFF WBC: CPT | Performed by: STUDENT IN AN ORGANIZED HEALTH CARE EDUCATION/TRAINING PROGRAM

## 2024-11-20 PROCEDURE — 71046 X-RAY EXAM CHEST 2 VIEWS: CPT | Mod: TC,PN

## 2024-11-20 PROCEDURE — 84403 ASSAY OF TOTAL TESTOSTERONE: CPT | Performed by: STUDENT IN AN ORGANIZED HEALTH CARE EDUCATION/TRAINING PROGRAM

## 2024-11-20 PROCEDURE — 80053 COMPREHEN METABOLIC PANEL: CPT | Performed by: STUDENT IN AN ORGANIZED HEALTH CARE EDUCATION/TRAINING PROGRAM

## 2024-11-20 PROCEDURE — 84443 ASSAY THYROID STIM HORMONE: CPT | Performed by: STUDENT IN AN ORGANIZED HEALTH CARE EDUCATION/TRAINING PROGRAM

## 2024-11-20 PROCEDURE — 84439 ASSAY OF FREE THYROXINE: CPT | Performed by: STUDENT IN AN ORGANIZED HEALTH CARE EDUCATION/TRAINING PROGRAM

## 2024-11-20 RX ORDER — GENTIAN VIOLET 2% 2 G/100ML
0.5 SOLUTION TOPICAL DAILY
Qty: 59 ML | Refills: 0 | Status: SHIPPED | OUTPATIENT
Start: 2024-11-20

## 2024-11-20 RX ORDER — TRAMADOL HYDROCHLORIDE 50 MG/1
50 TABLET ORAL EVERY 8 HOURS PRN
Qty: 21 TABLET | Refills: 0 | Status: SHIPPED | OUTPATIENT
Start: 2024-11-20

## 2024-11-20 NOTE — ASSESSMENT & PLAN NOTE
Unclear night sweats versus heavy sweating.  Testosterone  TSH, T4  No blood per rectum  No weight loss  Patient reported after visit had exposure to TB years prior but no TB or latent.  Chest Xray ordered.

## 2024-11-20 NOTE — ASSESSMENT & PLAN NOTE
Xray today  Tramadol 50 mg q8 hours PRN pain.  Last fill given until patient completes PT or sees rheumatology
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
## 2024-12-03 ENCOUNTER — HOSPITAL ENCOUNTER (OUTPATIENT)
Dept: WOUND CARE | Facility: HOSPITAL | Age: 49
Discharge: HOME | End: 2024-12-03
Attending: NURSE PRACTITIONER
Payer: MEDICAID

## 2024-12-03 VITALS
RESPIRATION RATE: 18 BRPM | SYSTOLIC BLOOD PRESSURE: 112 MMHG | TEMPERATURE: 98 F | DIASTOLIC BLOOD PRESSURE: 73 MMHG | HEART RATE: 68 BPM | OXYGEN SATURATION: 100 % | BODY MASS INDEX: 28.6 KG/M2 | HEIGHT: 66 IN | WEIGHT: 177.94 LBS

## 2024-12-03 DIAGNOSIS — L84 CALLUS BETWEEN TOES: ICD-10-CM

## 2024-12-03 DIAGNOSIS — L98.8 MACERATION OF SKIN: ICD-10-CM

## 2024-12-03 DIAGNOSIS — L97.511 SKIN ULCER OF TOE OF RIGHT FOOT, LIMITED TO BREAKDOWN OF SKIN: Primary | ICD-10-CM

## 2024-12-03 PROCEDURE — 11055 PARING/CUTG B9 HYPRKER LES 1: CPT

## 2024-12-03 PROCEDURE — 27000999 HC MEDICAL RECORD PHOTO DOCUMENTATION

## 2024-12-03 PROCEDURE — 11055 PARING/CUTG B9 HYPRKER LES 1: CPT | Mod: ,,, | Performed by: NURSE PRACTITIONER

## 2024-12-03 PROCEDURE — 99211 OFF/OP EST MAY X REQ PHY/QHP: CPT

## 2024-12-03 PROCEDURE — 99213 OFFICE O/P EST LOW 20 MIN: CPT | Mod: 25,,, | Performed by: NURSE PRACTITIONER

## 2024-12-03 NOTE — PATIENT INSTRUCTIONS
Pt seen today by: Saritha Damon    Self care DRESSING INSTRUCTIONS:        Wound location: Rt 5th toe    Cleanse wound with Vashe or Dakins wound cleanser, pat dry  Apply Gentian Violet   to the skin     When bathing DO NOT SOAK FEET, cleanse feet, and in between toes, pat dry, avoid putting any lotion, grease , powder etc in between toes, keep dry and clean daily.      Return visit: 2 weeks    Nutrition:  The current daily value (%DV) for protein is 50 grams per day and is meant as a general goal for most people. Further increasing your dietary protein intake is very important for wound healing. Typically one needs over 100g of protein per day to help with wound healing needs.  If you are a dialysis patient or have problems with your kidneys, talk to your Nephrologist about how much protein you can take in with your condition.  Examples of high protein items that can be added to your diet include: eggs, chicken, red meats, almonds, cottage cheese, Greek yogurt, beans, and peanut butter.  Fortified protein bars, shakes and drinks can add 15-30 additional grams of protein per serving.  Also add:   1 daily general multivitamin   Vitamin C : 500mg twice daily   Zinc 220 mg daily  Vit D : once daily      Call our [facility] wound clinic for questions/concerns a [phone] .

## 2024-12-04 PROBLEM — L84 CALLUS BETWEEN TOES: Status: ACTIVE | Noted: 2024-12-04

## 2024-12-04 PROBLEM — L98.8 MACERATION OF SKIN: Status: ACTIVE | Noted: 2024-12-04

## 2024-12-04 NOTE — PROGRESS NOTES
[facility]   Outpatient Wound Care     Subjective:   Patient ID: Owen Montiel is a 49 y.o. male.    Chief Complaint: Wound Care      History of Present Illness:   49 y.o. Black or  male presents to wound care clinic today regarding maceration of right foot between 4th and 5th toe.  Ambulates without difficulty.   Followed by Leola Odom MD for PCP last appointment 11/20/2024.    Today's visit 12/03/2024:  Bilateral foot exam performed.  Patient is not a diabetic.  Patient had not purchase gentian violet since he was instructed to start applying between toes from PCP.  Callus noted between 4th and 5th toe of right foot.  Pared using #4 dermal curette.  Rationale for paring to alleviate pain and decrease pressure to area.  Instructed the importance of washing feet daily especially between toes dry very well.  Apply gentian violet.  Will have him return to the clinic in 2 weeks.  Instructed the importance of applying gentian violet to area keeping area clean and dry.  Verbalized  understanding of all instructions.      History includes:      Past Medical History:   Diagnosis Date    Arthritis       Past Surgical History:   Procedure Laterality Date    FRACTURE SURGERY  02/09/2021    Ankle surgery    HERNIA REPAIR  2001      Social History     Socioeconomic History    Marital status: Single   Tobacco Use    Smoking status: Every Day     Current packs/day: 0.50     Average packs/day: 0.5 packs/day for 24.2 years (12.1 ttl pk-yrs)     Types: Cigarettes     Start date: 9/8/2000     Passive exposure: Never    Smokeless tobacco: Never   Substance and Sexual Activity    Alcohol use: Never    Drug use: Yes     Frequency: 5.0 times per week     Types: Marijuana     Comment: everyday    Sexual activity: Yes     Partners: Female     Social Drivers  of Health     Financial Resource Strain: Low Risk  (9/26/2022)    Overall Financial Resource Strain (CARDIA)     Difficulty of Paying Living Expenses: Not hard at all   Food Insecurity: No Food Insecurity (9/26/2022)    Hunger Vital Sign     Worried About Running Out of Food in the Last Year: Never true     Ran Out of Food in the Last Year: Never true   Transportation Needs: No Transportation Needs (9/26/2022)    PRAPARE - Transportation     Lack of Transportation (Medical): No     Lack of Transportation (Non-Medical): No   Stress: No Stress Concern Present (9/26/2022)    [employer] [place] [employer] - Occupational Stress Questionnaire     Feeling of Stress : Not at all   Housing Stability: Unknown (9/26/2022)    Housing Stability Vital Sign     Unable to Pay for Housing in the Last Year: No     Unstable Housing in the Last Year: No   .      Current Outpatient Medications   Medication Sig Dispense Refill    gentian violet 2 % topical solution Apply 0.5 mLs topically Daily. Apply to toe daily (Patient not taking: Reported on 12/3/2024) 59 mL 0    hydrOXYzine HCL (ATARAX) 25 MG tablet Take 1 tablet (25 mg total) by mouth every 8 (eight) hours as needed for Anxiety. (Patient not taking: Reported on 12/3/2024) 90 tablet 2    traMADoL (ULTRAM) 50 mg tablet Take 1 tablet (50 mg total) by mouth every 8 (eight) hours as needed for Pain. (Patient not taking: Reported on 12/3/2024) 21 tablet 0     No current facility-administered medications for this encounter.       Review of Systems   All other systems reviewed and are negative.       Labs Reviewed:   Chemistry:  Lab Results   Component Value Date    BUN 9.5 11/20/2024    BUN 13.3 05/20/2024    CREATININE 1.39 (H) 11/20/2024    CREATININE 1.25 (H) 05/20/2024    EGFRNORACEVR >60 11/20/2024    EGFRNORACEVR >60 05/20/2024    GLUCOSE 99 11/20/2024    AST 14 11/20/2024    AST 15 05/20/2024    ALT 14 11/20/2024    ALT 16 05/20/2024    HGBA1C 5.4 02/20/2024         Hematology:  Lab Results   Component Value Date    WBC 5.67 11/20/2024    WBC 7.19 05/20/2024    HGB 15.3 11/20/2024    HGB 14.5 05/20/2024    HCT 45.2 11/20/2024    HCT 42.8 05/20/2024     11/20/2024     05/20/2024       Inflammatory Markers:  Lab Results   Component Value Date    HSCRP 0.05 01/07/2022    SEDRATE 5 01/07/2022        Objective:        Physical Exam  Vitals reviewed.   Cardiovascular:      Pulses:           Dorsalis pedis pulses are 2+ on the right side and 2+ on the left side.        Posterior tibial pulses are 2+ on the right side and 2+ on the left side.   Musculoskeletal:        Feet:    Feet:      Right foot:      Skin integrity: Skin breakdown and callus present.      Toenail Condition: Right toenails are normal.      Left foot:      Toenail Condition: Left toenails are normal.   Skin:     General: Skin is warm and dry.      Capillary Refill: Capillary refill takes less than 2 seconds.   Neurological:      Mental Status: He is alert.       12/03/24 1006 Toe, fifth   Present on Original Admission:    Primary Wound Type: Other   Side: Right   Orientation:    Wound Approximate Age at First Assessment (Weeks):    Wound Number:    Is this injury device related?:    Incision Type:    Closure Method:    Wound Description (Comments):    Type:    Additional Comments:    Ankle-Brachial Index:    Pulses:    Removal Indication and Assessment:    Wound Outcome:    Wound Image    12/03/24 1006   Drainage Amount Scant 12/03/24 1006   Drainage Characteristics/Odor Serosanguineous 12/03/24 1006   Appearance Pink 12/03/24 1006   Tissue loss description Full thickness 12/03/24 1006   Periwound Area Moist 12/03/24 1006   Wound Edges Undefined 12/03/24 1006   Wound Length (cm) 0.6 cm 12/03/24 1006   Wound Width (cm) 0.6 cm 12/03/24 1006   Wound Depth (cm) 0.3 cm 12/03/24 1006   Wound Volume (cm^3) 0.108 cm^3 12/03/24 1006   Wound Surface Area (cm^2) 0.36 cm^2 12/03/24 1006   Care Cleansed  with:;Antimicrobial agent 12/03/24 1006   Dressing Removed;Applied;Changed 12/03/24 1006         Assessment:         ICD-10-CM ICD-9-CM   1. Skin ulcer of toe of right foot, limited to breakdown of skin  L97.511 707.15   2. Maceration of skin  L98.8 709.8   3. Callus between toes  L84 700         Plan:   Tissue pathology and/or culture taken:  [] Yes [x] No   Sharp debridement performed:   [] Yes [x] No   Labs ordered this visit:   [] Yes [x] No   Imaging ordered this visit:   [] Yes [x] No         1. Skin ulcer of toe of right foot, limited to breakdown of skin     Cleansing daily with soap and water rinse pat dry very well and apply gentian violet topical.    Monitor for any signs of infection: Watch for increased drainage or pain, fevers, chills, swelling and report this to clinic.   2. Maceration of skin     Will apply gentian violet daily and twice daily as needed.   3. Callus between toes     Pared.  Instructed on proper foot care.     Orders Placed This Encounter   Procedures    Ambulatory referral/consult to Wound Clinic     Standing Status:   Standing     Number of Occurrences:   1     Referral Priority:   Routine     Referral Type:   Consultation     Referral Reason:   Specialty Services Required     Requested Specialty:   Wound Care     Number of Visits Requested:   1      The time spent including preparing to see the patient, obtaining patient history and assessment, evaluation of the plan of care, patient/caregiver counseling and education, orders, documentation, coordination of care, and other professional medical management activities for today's encounter was 25 minute.    Time spent performing procedures during today's encounter was 25 minute.    Follow up in about 2 weeks (around 12/17/2024).  Teaching provided on s/s to call wound clinic for promptly.   ER precautions taught for after hours and weekends.         JANIYA Dexter

## 2024-12-09 ENCOUNTER — TELEPHONE (OUTPATIENT)
Dept: FAMILY MEDICINE | Facility: CLINIC | Age: 49
End: 2024-12-09
Payer: MEDICAID

## 2024-12-09 NOTE — TELEPHONE ENCOUNTER
Called patient.  confirmed. Patient states that he does not believe that his pharmacy has his antibiotics that he was suppose to get because he did not receive it. Also wanted to know if albuterol will help him if he does not have asthma. I let the patient know that Dr. Odom is out of the office at this time and will be out until next week. I informed the patient maybe the pharmacy did receive it and that they did not have the script ready when he picked up his medication. Instructed the pharmacy that he should contact his pharmacy to check on the status of it and if they do not have it he should call the urgent care that he went to and let them know that his pharmacy did not receive it. I also let the patient know that his albuterol that they gave him is a steroid and it will help him to breath better and to breakdown the cold/mucus  on his chest. Patient understood. He will call his pharmacy about the antibiotic medication. I did let the patient know that if his symptoms did not improve then he should follow up in urgent care again or the ER. Patient states that the next place he will go is the ER if he needs. Understood. Patient will look into his medication.

## 2024-12-09 NOTE — TELEPHONE ENCOUNTER
----- Message from Karen sent at 12/9/2024 10:48 AM CST -----  .Who Called: Owen Montiel    Caller is requesting assistance/information from provider's office.    Symptoms (please be specific): n/a   How long has patient had these symptoms:  n/a  List of preferred pharmacies on file (remove unneeded): [unfilled]  If different, enter pharmacy into here including location and phone number: n/a      Preferred Method of Contact: Phone Call    Patient's Preferred Phone Number on File: [phone]     Best Call Back Number, if different:    Additional Information: Pt went to urgent care and was told he was diagnosed with walking pneumonia and stated the medication he received is not medication that will help him. He has questions regarding medication give. Pt is wanting a cb from nurse. Please advise, thank you.

## 2024-12-17 ENCOUNTER — HOSPITAL ENCOUNTER (OUTPATIENT)
Dept: WOUND CARE | Facility: HOSPITAL | Age: 49
Discharge: HOME | End: 2024-12-17
Attending: NURSE PRACTITIONER
Payer: MEDICAID

## 2024-12-17 VITALS
DIASTOLIC BLOOD PRESSURE: 72 MMHG | SYSTOLIC BLOOD PRESSURE: 115 MMHG | OXYGEN SATURATION: 98 % | RESPIRATION RATE: 18 BRPM | TEMPERATURE: 99 F | HEART RATE: 82 BPM

## 2024-12-17 DIAGNOSIS — L84 CALLUS BETWEEN TOES: ICD-10-CM

## 2024-12-17 DIAGNOSIS — L97.511 SKIN ULCER OF TOE OF RIGHT FOOT, LIMITED TO BREAKDOWN OF SKIN: Primary | ICD-10-CM

## 2024-12-17 DIAGNOSIS — L98.8 MACERATION OF SKIN: ICD-10-CM

## 2024-12-17 PROCEDURE — 99213 OFFICE O/P EST LOW 20 MIN: CPT | Mod: ,,, | Performed by: NURSE PRACTITIONER

## 2024-12-17 PROCEDURE — 27000999 HC MEDICAL RECORD PHOTO DOCUMENTATION

## 2024-12-17 PROCEDURE — 99211 OFF/OP EST MAY X REQ PHY/QHP: CPT

## 2024-12-17 RX ORDER — ALBUTEROL SULFATE 90 UG/1
2 INHALANT RESPIRATORY (INHALATION) EVERY 4 HOURS PRN
COMMUNITY

## 2024-12-17 NOTE — PROGRESS NOTES
[facility]   Outpatient Wound Care     Subjective:   Patient ID: Owen Montiel is a 49 y.o. male.    Chief Complaint: Wound Care      History of Present Illness:   49 y.o. Black or  male presents to wound care clinic today for 2 week follow up regarding maceration of right foot between 4th and 5th toe.  Ambulates without difficulty.  Reviewed previous medical history since last visit of 12/03/2024.  Followed by Leola Odom MD for PCP last appointment 11/20/2024.    Today's visit 12/17/2024:  Reviewed previous progress notes since last visit of 12/03/2024.  Right foot between 4th and 5th toe maceration resolving with gentian violet.  Reinforced the importance of cleansing feet daily dry well between toes and applying gentian violet for maceration only between 4th and 5th toe.  Informed once maceration has resolved  stopped gentian violet.  Will place him on an as needed basis for any skin issues.  Instructed to call the office with any questions, concerns, or new skin issues.  Verbalized understanding of all instructions.    12/03/2024:  Bilateral foot exam performed.  Patient is not a diabetic.  Patient had not purchase gentian violet since he was instructed to start applying between toes from PCP.  Callus noted between 4th and 5th toe of right foot.  Pared using #4 dermal curette.  Rationale for paring to alleviate pain and decrease pressure to area.  Instructed the importance of washing feet daily especially between toes dry very well.  Apply gentian violet.  Will have him return to the clinic in 2 weeks.  Instructed the importance of applying gentian violet to area keeping area clean and dry.  Verbalized  understanding of all instructions.      History includes:      Past Medical History:   Diagnosis Date    Arthritis       Past  Surgical History:   Procedure Laterality Date    FRACTURE SURGERY  02/09/2021    Ankle surgery    HERNIA REPAIR  2001      Social History     Socioeconomic History    Marital status: Single   Tobacco Use    Smoking status: Every Day     Current packs/day: 0.50     Average packs/day: 0.5 packs/day for 24.3 years (12.1 ttl pk-yrs)     Types: Cigarettes     Start date: 9/8/2000     Passive exposure: Never    Smokeless tobacco: Never   Substance and Sexual Activity    Alcohol use: Never    Drug use: Yes     Frequency: 5.0 times per week     Types: Marijuana     Comment: everyday    Sexual activity: Yes     Partners: Female     Social Drivers of Health     Financial Resource Strain: Low Risk  (9/26/2022)    Overall Financial Resource Strain (CARDIA)     Difficulty of Paying Living Expenses: Not hard at all   Food Insecurity: No Food Insecurity (9/26/2022)    Hunger Vital Sign     Worried About Running Out of Food in the Last Year: Never true     Ran Out of Food in the Last Year: Never true   Transportation Needs: No Transportation Needs (9/26/2022)    PRAPARE - Transportation     Lack of Transportation (Medical): No     Lack of Transportation (Non-Medical): No   Stress: No Stress Concern Present (9/26/2022)    [employer] [place] [employer] - Occupational Stress Questionnaire     Feeling of Stress : Not at all   Housing Stability: Unknown (9/26/2022)    Housing Stability Vital Sign     Unable to Pay for Housing in the Last Year: No     Unstable Housing in the Last Year: No   .      Current Outpatient Medications   Medication Sig Dispense Refill    albuterol (PROVENTIL/VENTOLIN HFA) 90 mcg/actuation inhaler Inhale 2 puffs into the lungs every 4 (four) hours as needed.      gentian violet 2 % topical solution Apply 0.5 mLs topically Daily. Apply to toe daily (Patient not taking: Reported on 12/3/2024) 59 mL 0    hydrOXYzine HCL (ATARAX) 25 MG tablet Take 1 tablet (25 mg total) by mouth every 8 (eight) hours as  needed for Anxiety. (Patient not taking: Reported on 12/3/2024) 90 tablet 2    traMADoL (ULTRAM) 50 mg tablet Take 1 tablet (50 mg total) by mouth every 8 (eight) hours as needed for Pain. (Patient not taking: Reported on 12/3/2024) 21 tablet 0     No current facility-administered medications for this encounter.       Review of Systems   All other systems reviewed and are negative.         Labs Reviewed:   Chemistry:  Lab Results   Component Value Date    BUN 9.5 11/20/2024    BUN 13.3 05/20/2024    CREATININE 1.39 (H) 11/20/2024    CREATININE 1.25 (H) 05/20/2024    EGFRNORACEVR >60 11/20/2024    EGFRNORACEVR >60 05/20/2024    GLUCOSE 99 11/20/2024    AST 14 11/20/2024    AST 15 05/20/2024    ALT 14 11/20/2024    ALT 16 05/20/2024    HGBA1C 5.4 02/20/2024        Hematology:  Lab Results   Component Value Date    WBC 5.67 11/20/2024    WBC 7.19 05/20/2024    HGB 15.3 11/20/2024    HGB 14.5 05/20/2024    HCT 45.2 11/20/2024    HCT 42.8 05/20/2024     11/20/2024     05/20/2024       Inflammatory Markers:  Lab Results   Component Value Date    HSCRP 0.05 01/07/2022    SEDRATE 5 01/07/2022        Objective:        Physical Exam  Vitals reviewed.   Cardiovascular:      Pulses:           Dorsalis pedis pulses are 2+ on the right side and 2+ on the left side.        Posterior tibial pulses are 2+ on the right side and 2+ on the left side.   Musculoskeletal:        Feet:    Feet:      Right foot:      Skin integrity: Skin breakdown present.      Toenail Condition: Right toenails are normal.      Left foot:      Toenail Condition: Left toenails are normal.   Skin:     General: Skin is warm and dry.      Capillary Refill: Capillary refill takes less than 2 seconds.   Neurological:      Mental Status: He is alert.                Assessment:         ICD-10-CM ICD-9-CM   1. Skin ulcer of toe of right foot, limited to breakdown of skin  L97.511 707.15   2. Maceration of skin  L98.8 709.8   3. Callus between toes  L84  700         Plan:   Tissue pathology and/or culture taken:  [] Yes [x] No   Sharp debridement performed:   [] Yes [x] No   Labs ordered this visit:   [] Yes [x] No   Imaging ordered this visit:   [] Yes [x] No         1. Skin ulcer of toe of right foot, limited to breakdown of skin     Continue:    Cleansing daily with soap and water rinse pat dry very well and apply gentian violet topical.    Monitor for any signs of infection: Watch for increased drainage or pain, fevers, chills, swelling and report this to clinic.   2. Maceration of skin     Resolving.  Will apply gentian violet daily and twice daily as needed.   3. Callus between toes     Resolved.  Instructed on proper foot care.     No orders of the defined types were placed in this encounter.     The time spent including preparing to see the patient, obtaining patient history and assessment, evaluation of the plan of care, patient/caregiver counseling and education, orders, documentation, coordination of care, and other professional medical management activities for today's encounter was 20 minute.    Time spent performing procedures during today's encounter was 15 minute.    Follow up prn skin issues.  Teaching provided on s/s to call wound clinic for promptly.   ER precautions taught for after hours and weekends.         JANIYA Dexter

## 2024-12-24 ENCOUNTER — HOSPITAL ENCOUNTER (EMERGENCY)
Facility: HOSPITAL | Age: 49
Discharge: LEFT BEFORE DISCHARGE | End: 2024-12-24
Attending: STUDENT IN AN ORGANIZED HEALTH CARE EDUCATION/TRAINING PROGRAM
Payer: MEDICAID

## 2024-12-24 VITALS
RESPIRATION RATE: 18 BRPM | BODY MASS INDEX: 26.98 KG/M2 | HEART RATE: 72 BPM | DIASTOLIC BLOOD PRESSURE: 70 MMHG | TEMPERATURE: 98 F | OXYGEN SATURATION: 100 % | SYSTOLIC BLOOD PRESSURE: 138 MMHG | WEIGHT: 171.88 LBS | HEIGHT: 67 IN

## 2024-12-24 DIAGNOSIS — M54.2 NECK PAIN: ICD-10-CM

## 2024-12-24 LAB
ALBUMIN SERPL-MCNC: 4 G/DL (ref 3.5–5)
ALBUMIN/GLOB SERPL: 1.5 RATIO (ref 1.1–2)
ALP SERPL-CCNC: 72 UNIT/L (ref 40–150)
ALT SERPL-CCNC: 19 UNIT/L (ref 0–55)
ANION GAP SERPL CALC-SCNC: 8 MEQ/L
AST SERPL-CCNC: 17 UNIT/L (ref 5–34)
BASOPHILS # BLD AUTO: 0.05 X10(3)/MCL
BASOPHILS NFR BLD AUTO: 0.6 %
BILIRUB SERPL-MCNC: 0.2 MG/DL
BUN SERPL-MCNC: 13.7 MG/DL (ref 8.9–20.6)
CALCIUM SERPL-MCNC: 9.5 MG/DL (ref 8.4–10.2)
CHLORIDE SERPL-SCNC: 109 MMOL/L (ref 98–107)
CO2 SERPL-SCNC: 26 MMOL/L (ref 22–29)
CREAT SERPL-MCNC: 1.3 MG/DL (ref 0.72–1.25)
CREAT/UREA NIT SERPL: 11
EOSINOPHIL # BLD AUTO: 0.14 X10(3)/MCL (ref 0–0.9)
EOSINOPHIL NFR BLD AUTO: 1.7 %
ERYTHROCYTE [DISTWIDTH] IN BLOOD BY AUTOMATED COUNT: 13.1 % (ref 11.5–17)
GFR SERPLBLD CREATININE-BSD FMLA CKD-EPI: >60 ML/MIN/1.73/M2
GLOBULIN SER-MCNC: 2.7 GM/DL (ref 2.4–3.5)
GLUCOSE SERPL-MCNC: 106 MG/DL (ref 74–100)
HCT VFR BLD AUTO: 40.3 % (ref 42–52)
HGB BLD-MCNC: 13.3 G/DL (ref 14–18)
IMM GRANULOCYTES # BLD AUTO: 0.02 X10(3)/MCL (ref 0–0.04)
IMM GRANULOCYTES NFR BLD AUTO: 0.2 %
INR PPP: 0.9
LYMPHOCYTES # BLD AUTO: 3.16 X10(3)/MCL (ref 0.6–4.6)
LYMPHOCYTES NFR BLD AUTO: 37.9 %
MAGNESIUM SERPL-MCNC: 2 MG/DL (ref 1.6–2.6)
MCH RBC QN AUTO: 30 PG (ref 27–31)
MCHC RBC AUTO-ENTMCNC: 33 G/DL (ref 33–36)
MCV RBC AUTO: 91 FL (ref 80–94)
MONOCYTES # BLD AUTO: 0.67 X10(3)/MCL (ref 0.1–1.3)
MONOCYTES NFR BLD AUTO: 8 %
NEUTROPHILS # BLD AUTO: 4.29 X10(3)/MCL (ref 2.1–9.2)
NEUTROPHILS NFR BLD AUTO: 51.6 %
NRBC BLD AUTO-RTO: 0 %
PLATELET # BLD AUTO: 189 X10(3)/MCL (ref 130–400)
PMV BLD AUTO: 11.8 FL (ref 7.4–10.4)
POTASSIUM SERPL-SCNC: 4.4 MMOL/L (ref 3.5–5.1)
PROT SERPL-MCNC: 6.7 GM/DL (ref 6.4–8.3)
PROTHROMBIN TIME: 12.1 SECONDS (ref 11.4–14)
RBC # BLD AUTO: 4.43 X10(6)/MCL (ref 4.7–6.1)
SODIUM SERPL-SCNC: 143 MMOL/L (ref 136–145)
STREP A PCR (OHS): NOT DETECTED
TROPONIN I SERPL-MCNC: <0.01 NG/ML (ref 0–0.04)
WBC # BLD AUTO: 8.33 X10(3)/MCL (ref 4.5–11.5)

## 2024-12-24 PROCEDURE — 87651 STREP A DNA AMP PROBE: CPT | Performed by: STUDENT IN AN ORGANIZED HEALTH CARE EDUCATION/TRAINING PROGRAM

## 2024-12-24 PROCEDURE — 84484 ASSAY OF TROPONIN QUANT: CPT | Performed by: STUDENT IN AN ORGANIZED HEALTH CARE EDUCATION/TRAINING PROGRAM

## 2024-12-24 PROCEDURE — 63600175 PHARM REV CODE 636 W HCPCS: Performed by: STUDENT IN AN ORGANIZED HEALTH CARE EDUCATION/TRAINING PROGRAM

## 2024-12-24 PROCEDURE — 80053 COMPREHEN METABOLIC PANEL: CPT | Performed by: STUDENT IN AN ORGANIZED HEALTH CARE EDUCATION/TRAINING PROGRAM

## 2024-12-24 PROCEDURE — 94761 N-INVAS EAR/PLS OXIMETRY MLT: CPT

## 2024-12-24 PROCEDURE — 85610 PROTHROMBIN TIME: CPT | Performed by: STUDENT IN AN ORGANIZED HEALTH CARE EDUCATION/TRAINING PROGRAM

## 2024-12-24 PROCEDURE — 83735 ASSAY OF MAGNESIUM: CPT | Performed by: STUDENT IN AN ORGANIZED HEALTH CARE EDUCATION/TRAINING PROGRAM

## 2024-12-24 PROCEDURE — 93005 ELECTROCARDIOGRAM TRACING: CPT

## 2024-12-24 PROCEDURE — 85025 COMPLETE CBC W/AUTO DIFF WBC: CPT | Performed by: STUDENT IN AN ORGANIZED HEALTH CARE EDUCATION/TRAINING PROGRAM

## 2024-12-24 PROCEDURE — 96374 THER/PROPH/DIAG INJ IV PUSH: CPT

## 2024-12-24 PROCEDURE — 99284 EMERGENCY DEPT VISIT MOD MDM: CPT | Mod: 25

## 2024-12-24 RX ORDER — KETOROLAC TROMETHAMINE 30 MG/ML
15 INJECTION, SOLUTION INTRAMUSCULAR; INTRAVENOUS
Status: COMPLETED | OUTPATIENT
Start: 2024-12-24 | End: 2024-12-24

## 2024-12-24 RX ADMIN — KETOROLAC TROMETHAMINE 15 MG: 30 INJECTION, SOLUTION INTRAMUSCULAR; INTRAVENOUS at 09:12

## 2024-12-24 NOTE — LETTER
Patient: Owen Montiel  YOB: 1975  Date: 12/24/2024 Time: 10:07 PM  Location: [facility]    Leaving the Hospital Against Medical Advice    Chart #:[id number]    This will certify that I, the undersigned,    ______________________________________________________________________    A patient in the above named medical center, having requested discharge and removal from the medical center against the advice of my attending physician(s), hereby release [facility], its physicians, officers and employees, severally and individually, from any and all liability of any nature whatsoever for any injury or harm or complication of any kind that may result directly or indirectly, by reason of my terminating my stay as a patient at [facility] and my departure from [facility], and hereby waive any and all rights of action I may now have or later acquire as a result of my voluntary departure from [facility] and the termination of my stay as a patient therein.    This release is made with the full knowledge of the danger that may result from the action which I am taking.      Date:_______________________                         ___________________________                                                                                    Patient/Legal Representative    Witness:        ____________________________                          ___________________________  Nurse                                                                        Physician

## 2024-12-25 NOTE — ED PROVIDER NOTES
Encounter Date: 12/24/2024       History     Chief Complaint   Patient presents with    Neck Pain     States left neck and jaw pain.  Recent diagnosis of pneumonia.  Antibiotics completed last week.       Patient presents to the emergency department due to left-sided neck pain.  He states it started last night.  It is a constant aching pain.  When asked where his pain is he points to just anterior of his left sternocleidomastoid.  Not necessarily worse with certain movements of the neck.  He states he feels like the pain radiates up into left jaw in the left side of his head.  Denies any focal weakness or numbness in his extremities.  He denies any changes vision.    The history is provided by the patient.     Review of patient's allergies indicates:  No Known Allergies  Past Medical History:   Diagnosis Date    Arthritis      Past Surgical History:   Procedure Laterality Date    FRACTURE SURGERY  02/09/2021    Ankle surgery    HERNIA REPAIR  2001     Family History   Problem Relation Name Age of Onset    Kidney disease Mother      Heart disease Father      Arthritis Sister Danyelle Montiel     Cancer Sister Danyelle Montiel      Social History     Tobacco Use    Smoking status: Every Day     Current packs/day: 0.50     Average packs/day: 0.5 packs/day for 24.3 years (12.1 ttl pk-yrs)     Types: Cigarettes     Start date: 9/8/2000     Passive exposure: Never    Smokeless tobacco: Never   Substance Use Topics    Alcohol use: Never    Drug use: Yes     Frequency: 5.0 times per week     Types: Marijuana     Comment: everyday     Review of Systems   Constitutional:  Negative for chills and fever.   HENT:  Negative for congestion and sore throat.    Respiratory:  Negative for cough and shortness of breath.    Cardiovascular:  Negative for chest pain and palpitations.   Gastrointestinal:  Negative for abdominal pain and nausea.   Genitourinary:  Negative for dysuria and hematuria.   Musculoskeletal:  Positive for neck pain.  Negative for arthralgias and myalgias.   Neurological:  Negative for dizziness and weakness.       Physical Exam     Initial Vitals [12/24/24 2056]   BP Pulse Resp Temp SpO2   120/84 78 17 97.9 °F (36.6 °C) 99 %      MAP       --         Physical Exam    Nursing note and vitals reviewed.  Constitutional: He appears well-developed and well-nourished.   HENT:   Head: Normocephalic and atraumatic.   Eyes: Conjunctivae are normal. Pupils are equal, round, and reactive to light.   Neck: Neck supple.   Normal range of motion.  Cardiovascular:  Normal rate, regular rhythm and normal heart sounds.           Pulmonary/Chest: Breath sounds normal. No respiratory distress.   Abdominal: Abdomen is soft. There is no abdominal tenderness.   Musculoskeletal:         General: No edema. Normal range of motion.      Cervical back: Normal range of motion and neck supple.     Neurological: He is alert and oriented to person, place, and time. He has normal strength. No cranial nerve deficit or sensory deficit.   Skin: Skin is warm and dry.         ED Course   Procedures  Labs Reviewed   COMPREHENSIVE METABOLIC PANEL - Abnormal       Result Value    Sodium 143      Potassium 4.4      Chloride 109 (*)     CO2 26      Glucose 106 (*)     Blood Urea Nitrogen 13.7      Creatinine 1.30 (*)     Calcium 9.5      Protein Total 6.7      Albumin 4.0      Globulin 2.7      Albumin/Globulin Ratio 1.5      Bilirubin Total 0.2      ALP 72      ALT 19      AST 17      eGFR >60      Anion Gap 8.0      BUN/Creatinine Ratio 11     CBC WITH DIFFERENTIAL - Abnormal    WBC 8.33      RBC 4.43 (*)     Hgb 13.3 (*)     Hct 40.3 (*)     MCV 91.0      MCH 30.0      MCHC 33.0      RDW 13.1      Platelet 189      MPV 11.8 (*)     Neut % 51.6      Lymph % 37.9      Mono % 8.0      Eos % 1.7      Basophil % 0.6      Lymph # 3.16      Neut # 4.29      Mono # 0.67      Eos # 0.14      Baso # 0.05      IG# 0.02      IG% 0.2      NRBC% 0.0     MAGNESIUM - Normal     Magnesium Level 2.00     TROPONIN I - Normal    Troponin-I <0.010     PROTIME-INR - Normal    PT 12.1      INR 0.9     CBC W/ AUTO DIFFERENTIAL    Narrative:     The following orders were created for panel order CBC auto differential.  Procedure                               Abnormality         Status                     ---------                               -----------         ------                     CBC with Differential[[id number]]       Abnormal            Final result                 Please view results for these tests on the individual orders.   STREP GROUP A BY PCR     EKG Readings: (Independently Interpreted)   Initial Reading: No STEMI. Rhythm: Normal Sinus Rhythm. Heart Rate: 64. Ectopy: No Ectopy. Conduction: Normal. Axis: Normal.       Imaging Results    None          Medications   ketorolac injection 15 mg (15 mg Intravenous Given 12/24/24 2120)     Medical Decision Making  Vital signs are stable.  EKGs without acute ischemic changes.  CBC, CMP, troponin reassuring.  Initially ordered a CTA of the neck further evaluation, patient left AMA while I was busy dealing with other patients in the emergency department    Amount and/or Complexity of Data Reviewed  Labs: ordered. Decision-making details documented in ED Course.  Radiology: ordered.  ECG/medicine tests: ordered and independent interpretation performed. Decision-making details documented in ED Course.    Risk  Prescription drug management.                                      Clinical Impression:  Final diagnoses:  [M54.2] Neck pain          ED Disposition Condition    Nikhil Street MD  12/24/24 6607

## 2024-12-26 LAB
OHS QRS DURATION: 100 MS
OHS QTC CALCULATION: 412 MS

## 2025-03-05 ENCOUNTER — TELEPHONE (OUTPATIENT)
Dept: RHEUMATOLOGY | Facility: CLINIC | Age: 50
End: 2025-03-05

## 2025-03-05 ENCOUNTER — HOSPITAL ENCOUNTER (OUTPATIENT)
Dept: RADIOLOGY | Facility: HOSPITAL | Age: 50
Discharge: HOME | End: 2025-03-05
Attending: INTERNAL MEDICINE
Payer: MEDICAID

## 2025-03-05 ENCOUNTER — OFFICE VISIT (OUTPATIENT)
Dept: RHEUMATOLOGY | Facility: CLINIC | Age: 50
End: 2025-03-05
Payer: MEDICAID

## 2025-03-05 VITALS
SYSTOLIC BLOOD PRESSURE: 134 MMHG | WEIGHT: 171.38 LBS | OXYGEN SATURATION: 100 % | RESPIRATION RATE: 20 BRPM | HEIGHT: 66 IN | DIASTOLIC BLOOD PRESSURE: 82 MMHG | HEART RATE: 78 BPM | TEMPERATURE: 98 F | BODY MASS INDEX: 27.54 KG/M2

## 2025-03-05 DIAGNOSIS — M25.512 CHRONIC PAIN OF BOTH SHOULDERS: ICD-10-CM

## 2025-03-05 DIAGNOSIS — M54.50 CHRONIC BILATERAL LOW BACK PAIN WITHOUT SCIATICA: ICD-10-CM

## 2025-03-05 DIAGNOSIS — N18.2 STAGE 2 CHRONIC KIDNEY DISEASE: ICD-10-CM

## 2025-03-05 DIAGNOSIS — G89.29 CHRONIC PAIN OF BOTH SHOULDERS: ICD-10-CM

## 2025-03-05 DIAGNOSIS — M25.511 CHRONIC PAIN OF BOTH SHOULDERS: ICD-10-CM

## 2025-03-05 DIAGNOSIS — F17.200 SMOKER: ICD-10-CM

## 2025-03-05 DIAGNOSIS — F17.210 NICOTINE DEPENDENCE, CIGARETTES, UNCOMPLICATED: ICD-10-CM

## 2025-03-05 DIAGNOSIS — G89.29 CHRONIC BILATERAL LOW BACK PAIN WITHOUT SCIATICA: ICD-10-CM

## 2025-03-05 DIAGNOSIS — M54.2 NECK PAIN: ICD-10-CM

## 2025-03-05 DIAGNOSIS — R76.8 RHEUMATOID FACTOR POSITIVE: Primary | ICD-10-CM

## 2025-03-05 DIAGNOSIS — M15.0 PRIMARY OSTEOARTHRITIS INVOLVING MULTIPLE JOINTS: ICD-10-CM

## 2025-03-05 DIAGNOSIS — F12.90 MARIJUANA USE: ICD-10-CM

## 2025-03-05 PROCEDURE — 3079F DIAST BP 80-89 MM HG: CPT | Mod: CPTII,,, | Performed by: INTERNAL MEDICINE

## 2025-03-05 PROCEDURE — 72200 X-RAY EXAM SI JOINTS: CPT | Mod: TC

## 2025-03-05 PROCEDURE — 99214 OFFICE O/P EST MOD 30 MIN: CPT | Mod: PBBFAC | Performed by: INTERNAL MEDICINE

## 2025-03-05 PROCEDURE — 3075F SYST BP GE 130 - 139MM HG: CPT | Mod: CPTII,,, | Performed by: INTERNAL MEDICINE

## 2025-03-05 PROCEDURE — 3008F BODY MASS INDEX DOCD: CPT | Mod: CPTII,,, | Performed by: INTERNAL MEDICINE

## 2025-03-05 PROCEDURE — 1159F MED LIST DOCD IN RCRD: CPT | Mod: CPTII,,, | Performed by: INTERNAL MEDICINE

## 2025-03-05 PROCEDURE — 99205 OFFICE O/P NEW HI 60 MIN: CPT | Mod: S$PBB,,, | Performed by: INTERNAL MEDICINE

## 2025-03-05 NOTE — PROGRESS NOTES
Patient ID: [id number]     Chief Complaint: new patient (New Patient being seen for Rheumatoid arthritis work up. Reports pain 2-3/10 generalized pain. Starts to neck and move to shoulder blade  sometimes start on right side sometimes on left side. Reports this has been ongoing for approx. 5 years that has worsened over the years. Reports Tramadol and muscle relaxers but he doesn't take it because it makes him drowsy for work. Has never seen a Rheumatologist before/)      Referred By: No ref. provider found     HPI:     Owen Montiel is a 49 y.o. male here today for a new patient visit. He has a past medical history of CKD, marijuana use.    Patient states he has had back pain in his neck which involves shoulders as well as lower back pain for past 10 years, worsening in the past 3-5 years. He has had work up with imaging demonstrating arthritis in his shoulder and positive RF factor IgG. His occupation involves heavy labor with AC units. He states pain is worse from awakening which can be better on other days, and at its best in the afternoon. Otherwise patient has tried Tramadol and Muscle relaxants however efficacy is limited and too sedating.     Denies history of fevers, rashes, photosensitivity, oral or nasal ulcers, h/o MI, stroke, seizures, h/o PE or DVT, Raynaud's phenomenon, uveitis, malignancies.     Family history of autoimmune disease: None    Smoking: Tobacco Use History[1]       -------------------------------------    Arthritis        Past Surgical History:   Procedure Laterality Date    FRACTURE SURGERY  02/09/2021    Ankle surgery    HERNIA REPAIR  2001       Review of patient's allergies indicates:  No Known Allergies    Outpatient Medications Marked as Taking for the 3/5/25 encounter (Office Visit) with Valeria Cooper MD   Medication Sig Dispense Refill    traMADoL (ULTRAM) 50 mg tablet Take 1 tablet (50 mg total) by mouth every 8 (eight) hours as needed for Pain. 21 tablet 0       Social  "History[2]     Family History   Problem Relation Name Age of Onset    Kidney disease Mother      Heart disease Father      Arthritis Sister Danyelle Montiel     Cancer Sister Danyelle Montiel           There is no immunization history on file for this patient.    Patient Care Team:  Leola Odom MD as PCP - General (Family Medicine)     Subjective:     Constitutional:  Denies chills. Denies fever. Denies night sweats. Denies weight loss.   Ophthalmology: Denies blurred vision. Denies dry eyes. Denies eye pain. Denies Itching and redness.   ENT: Denies oral ulcers. Denies epistaxis. Denies dry mouth. Denies swollen glands.   Endocrine: Denies diabetes. Denies thyroid Problems.   Respiratory: Denies cough. Denies shortness of breath. Denies shortness of breath with exertion. Denies hemoptysis.   Cardiovascular: Denies chest pain at rest. Denies chest pain with exertion. Denies palpitations.    Gastrointestinal: Denies abdominal pain. Denies diarrhea. Denies nausea. Denies vomiting. Denies hematemesis or hematochezia. Denies heartburn.  Genitourinary: Denies blood in urine.  Musculoskeletal: See HPI for details  Integumentary: Denies rash. Denies photosensitivity.   Peripheral Vascular: Denies Ulcers of hands and/or feet. Denies Cold extremities.   Neurologic: Denies dizziness. Denies headache.  Denies loss of strength. Denies numbness or tingling.   Psychiatric: Denies depression. Denies anxiety. Denies suicidal/homicidal ideations.      Objective:     /82 (BP Location: Right arm, Patient Position: Sitting)   Pulse 78   Temp 98.2 °F (36.8 °C) (Oral)   Resp 20   Ht 5' 6" (1.676 m)   Wt 77.7 kg (171 lb 6.4 oz)   SpO2 100%   BMI 27.66 kg/m²     Physical Exam    General Appearance: alert, pleasant, in no acute distress.  Skin: Skin color, texture, turgor normal. No rashes or lesions.  Eyes:  extraocular movement intact (EOMI), pupils equal, round, reactive to light and accommodation, conjunctiva " clear.  ENT: No oral or nasal ulcers.  Neck:  Neck supple. No adenopathy.   Lungs: CTA throughout without crackles, rhonchi, or wheezes.   Heart: RRR w/o murmurs.  No edema. 2+ DP/TP pulse.  Abdomen: Soft, non-tender, no masses, rebound or guarding.  Neuro: Alert, oriented, CN II-XII GI, sensory and motor innervation intact.  Musculoskeletal: No synovial joint involvement, negative RAMIREZ  Psych: Alert, oriented, normal eye contact.    Joint Exam:  DIPs, PIPs, MCPs: no pain on palpation, no swelling or redness, no nodules.  Wrists: no pain on palpation, no swelling or redness, good range of motion.  Elbows: no pain on palpation, no swelling or redness, good range of motion, no nodules.  Shoulders: no pain on palpation, no swelling or redness, good range of motion.  Back/Neck: no pain on palpation.   Hips: no pain on palpation, good range of motion.  Knees: no pain on palpation, no swelling or redness, good range of motion.  Ankles: no pain on palpation, no swelling or redness, good range of motion.  Feet: no pain on palpation, no swelling or redness, no nodules.    Labs: 1/2022: RF IgG - 10, HARRIS - ARUP overall negative    Imaging: Xray Cervical Spine 1/2022: No C1-C2 alignment issues    Assessment:       ICD-10-CM ICD-9-CM   1. Rheumatoid factor positive  R76.8 795.79   2. Primary osteoarthritis involving multiple joints  M15.0 715.98   3. Neck pain  M54.2 723.1   4. Chronic pain of both shoulders  M25.511 719.41    G89.29 338.29    M25.512    5. Nicotine dependence, cigarettes, uncomplicated  F17.210 305.1   6. Chronic bilateral low back pain without sciatica  M54.50 724.2    G89.29 338.29   7. Marijuana use  F12.90 305.20   8. Smoker  F17.200 305.1   9. Stage 2 chronic kidney disease  N18.2 585.2        Plan:     1. Rheumatoid factor positive   - Likely smoking contributory  - Will repeat RF and note trend, as well as order Anti-CCP    2. Primary osteoarthritis involving multiple joints   - Occupational likely  etiology  - Will check HLA B27, Imaging of SI joints though AS lower suspicion, does demonstrate elements of inflammatory lower back pain  - Referral to PT made   3. Neck pain   - As #2   4. Chronic pain of both shoulders   - As #2   5. Nicotine dependence, cigarettes, uncomplicated   - Advised cessation, patient not interested in referral to tobacco clinic at this time, will attempt self weaning   6. Chronic bilateral low back pain without sciatica   - As #2, advised Tylenol   7. Marijuana use  - Advised cessation    8. Smoker   - Advised cessation   9. Stage 2 chronic kidney disease   - mgmt per PCP, advised againts NSAIDs        Follow up in about 7 months (around 10/5/2025). In addition to their scheduled follow up, the patient has also been instructed to follow up on as needed basis/sooner depending on lab results.      Jcarlos Aleman MD  \[facility]\"" INTERNAL MEDICINE PGY-3  03/05/2025 9:23 AM  [facility]               [1]   Social History  Tobacco Use   Smoking Status Every Day    Current packs/day: 0.50    Average packs/day: 0.5 packs/day for 24.5 years (12.2 ttl pk-yrs)    Types: Cigarettes    Start date: 9/8/2000    Passive exposure: Never   Smokeless Tobacco Never   [2]   Social History  Socioeconomic History    Marital status: Single   Tobacco Use    Smoking status: Every Day     Current packs/day: 0.50     Average packs/day: 0.5 packs/day for 24.5 years (12.2 ttl pk-yrs)     Types: Cigarettes     Start date: 9/8/2000     Passive exposure: Never    Smokeless tobacco: Never   Substance and Sexual Activity    Alcohol use: Never    Drug use: Yes     Frequency: 5.0 times per week     Types: Marijuana     Comment: everyday    Sexual activity: Yes     Partners: Female     Social Drivers of Health     Financial Resource Strain: Low Risk  (9/26/2022)    Overall Financial Resource Strain (CARDIA)     Difficulty of Paying Living Expenses: Not hard at all   Food Insecurity: No Food Insecurity (9/26/2022)     Hunger Vital Sign     Worried About Running Out of Food in the Last Year: Never true     Ran Out of Food in the Last Year: Never true   Transportation Needs: No Transportation Needs (9/26/2022)    PRAPARE - Transportation     Lack of Transportation (Medical): No     Lack of Transportation (Non-Medical): No   Stress: No Stress Concern Present (9/26/2022)    [employer] [place] [employer] - Occupational Stress Questionnaire     Feeling of Stress : Not at all   Housing Stability: Unknown (9/26/2022)    Housing Stability Vital Sign     Unable to Pay for Housing in the Last Year: No     Unstable Housing in the Last Year: No

## 2025-03-05 NOTE — TELEPHONE ENCOUNTER
Patient handed Physical Therapy referral in hand to take to outpatient Phy. There. of his choice. Reports there is a place behind his home off [address] in [place].

## 2025-03-10 ENCOUNTER — RESULTS FOLLOW-UP (OUTPATIENT)
Dept: RHEUMATOLOGY | Facility: CLINIC | Age: 50
End: 2025-03-10
Payer: MEDICAID

## 2025-03-10 NOTE — PROGRESS NOTES
Please let him know blood testing for rheumatoid arthritis came back negative.  Blood work for ankylosing spondylitis came back negative too.  X-ray of SI joints are normal.

## 2025-07-07 ENCOUNTER — TELEPHONE (OUTPATIENT)
Dept: FAMILY MEDICINE | Facility: CLINIC | Age: 50
End: 2025-07-07
Payer: MEDICAID

## 2025-07-07 NOTE — TELEPHONE ENCOUNTER
Attempted to contact patient regarding [facility] colon cancer screening results. No answer. Left voicemail. Negative results scanned in patient's chart.

## 2025-07-23 ENCOUNTER — HOSPITAL ENCOUNTER (OUTPATIENT)
Dept: RADIOLOGY | Facility: HOSPITAL | Age: 50
Discharge: HOME | End: 2025-07-23
Payer: MEDICAID

## 2025-07-23 DIAGNOSIS — M50.90 CERVICAL DISC DISEASE: ICD-10-CM

## 2025-07-23 PROCEDURE — 72052 X-RAY EXAM NECK SPINE 6/>VWS: CPT | Mod: TC
